# Patient Record
Sex: MALE | NOT HISPANIC OR LATINO | Employment: FULL TIME | URBAN - METROPOLITAN AREA
[De-identification: names, ages, dates, MRNs, and addresses within clinical notes are randomized per-mention and may not be internally consistent; named-entity substitution may affect disease eponyms.]

---

## 2019-01-16 ENCOUNTER — TRANSCRIBE ORDERS (OUTPATIENT)
Dept: ADMINISTRATIVE | Facility: HOSPITAL | Age: 59
End: 2019-01-16

## 2019-01-16 DIAGNOSIS — M77.11 RIGHT LATERAL EPICONDYLITIS: Primary | ICD-10-CM

## 2021-01-06 ENCOUNTER — TELEPHONE (OUTPATIENT)
Dept: GASTROENTEROLOGY | Facility: CLINIC | Age: 61
End: 2021-01-06

## 2021-01-06 DIAGNOSIS — K21.9 GASTROESOPHAGEAL REFLUX DISEASE WITHOUT ESOPHAGITIS: Primary | ICD-10-CM

## 2021-01-06 RX ORDER — PANTOPRAZOLE SODIUM 40 MG/1
40 TABLET, DELAYED RELEASE ORAL DAILY
Qty: 90 TABLET | Refills: 3 | Status: SHIPPED | OUTPATIENT
Start: 2021-01-06

## 2021-02-12 NOTE — TELEPHONE ENCOUNTER
PT LEFT MESSAGE TO SWITCH PANTOPRAZOLE TO OMEPRAZOLE, LEFT MESSAGE FOR PT TO RETURN CALL TO DISCUSS

## 2021-04-08 DIAGNOSIS — Z23 ENCOUNTER FOR IMMUNIZATION: ICD-10-CM

## 2022-03-09 ENCOUNTER — TELEPHONE (OUTPATIENT)
Dept: GASTROENTEROLOGY | Facility: CLINIC | Age: 62
End: 2022-03-09

## 2022-03-09 NOTE — TELEPHONE ENCOUNTER
Recall call went out via [employer] in 5/2021 with no return calls from pt to schedule  Pt is due for an appt with Dr Raisa Tenorio for hx of GERD  I lmom for pt to please call back to schedule an appt  Will call pt again in two weeks if do not hear back from him

## 2022-03-23 NOTE — TELEPHONE ENCOUNTER
I lmom for pt to please call back to schedule a yearly f/u appt with Dr Felix Becerril   Will wait for pt's call back at this time

## 2023-01-01 ENCOUNTER — APPOINTMENT (EMERGENCY)
Dept: RADIOLOGY | Facility: HOSPITAL | Age: 63
End: 2023-01-01

## 2023-01-01 ENCOUNTER — HOSPITAL ENCOUNTER (INPATIENT)
Facility: HOSPITAL | Age: 63
LOS: 1 days | Discharge: HOME | End: 2023-01-02
Attending: EMERGENCY MEDICINE | Admitting: INTERNAL MEDICINE

## 2023-01-01 DIAGNOSIS — I21.3 STEMI (ST ELEVATION MYOCARDIAL INFARCTION) (HCC): ICD-10-CM

## 2023-01-01 DIAGNOSIS — I21.29 ACUTE MI, LATERAL WALL (HCC): Primary | ICD-10-CM

## 2023-01-01 PROBLEM — D72.829 LEUKOCYTOSIS: Status: ACTIVE | Noted: 2023-01-01

## 2023-01-01 PROBLEM — E87.6 HYPOKALEMIA: Status: ACTIVE | Noted: 2023-01-01

## 2023-01-01 PROBLEM — E78.2 MIXED HYPERLIPIDEMIA: Status: ACTIVE | Noted: 2023-01-01

## 2023-01-01 PROBLEM — K21.9 GERD (GASTROESOPHAGEAL REFLUX DISEASE): Status: ACTIVE | Noted: 2023-01-01

## 2023-01-01 PROBLEM — R79.89 ELEVATED LFTS: Status: ACTIVE | Noted: 2023-01-01

## 2023-01-01 LAB
2HR DELTA HS TROPONIN: 224 NG/L
4HR DELTA HS TROPONIN: 836 NG/L
ALBUMIN SERPL BCP-MCNC: 4.2 G/DL (ref 3.5–5)
ALP SERPL-CCNC: 89 U/L (ref 34–104)
ALT SERPL W P-5'-P-CCNC: 64 U/L (ref 7–52)
ANION GAP SERPL CALCULATED.3IONS-SCNC: 11 MMOL/L (ref 4–13)
ANION GAP SERPL CALCULATED.3IONS-SCNC: 14 MMOL/L (ref 4–13)
APTT PPP: 24 SECONDS (ref 23–37)
AST SERPL W P-5'-P-CCNC: 32 U/L (ref 13–39)
ATRIAL RATE: 68 BPM
ATRIAL RATE: 81 BPM
BASOPHILS # BLD MANUAL: 0 THOUSAND/UL (ref 0–0.1)
BASOPHILS NFR MAR MANUAL: 0 % (ref 0–1)
BILIRUB SERPL-MCNC: 0.47 MG/DL (ref 0.2–1)
BUN SERPL-MCNC: 22 MG/DL (ref 5–25)
BUN SERPL-MCNC: 23 MG/DL (ref 5–25)
CALCIUM SERPL-MCNC: 10.2 MG/DL (ref 8.4–10.2)
CALCIUM SERPL-MCNC: 9.3 MG/DL (ref 8.4–10.2)
CARDIAC TROPONIN I PNL SERPL HS: 230 NG/L
CARDIAC TROPONIN I PNL SERPL HS: 6 NG/L
CARDIAC TROPONIN I PNL SERPL HS: 842 NG/L
CHLORIDE SERPL-SCNC: 99 MMOL/L (ref 96–108)
CHLORIDE SERPL-SCNC: 99 MMOL/L (ref 96–108)
CHOLEST SERPL-MCNC: 223 MG/DL
CO2 SERPL-SCNC: 23 MMOL/L (ref 21–32)
CO2 SERPL-SCNC: 24 MMOL/L (ref 21–32)
CREAT SERPL-MCNC: 0.9 MG/DL (ref 0.6–1.3)
CREAT SERPL-MCNC: 1 MG/DL (ref 0.6–1.3)
EOSINOPHIL # BLD MANUAL: 0.25 THOUSAND/UL (ref 0–0.4)
EOSINOPHIL NFR BLD MANUAL: 2 % (ref 0–6)
ERYTHROCYTE [DISTWIDTH] IN BLOOD BY AUTOMATED COUNT: 12.9 % (ref 11.6–15.1)
EST. AVERAGE GLUCOSE BLD GHB EST-MCNC: 108 MG/DL
EST. AVERAGE GLUCOSE BLD GHB EST-MCNC: 111 MG/DL
GFR SERPL CREATININE-BSD FRML MDRD: 79 ML/MIN/1.73SQ M
GFR SERPL CREATININE-BSD FRML MDRD: 89 ML/MIN/1.73SQ M
GLUCOSE SERPL-MCNC: 119 MG/DL (ref 65–140)
GLUCOSE SERPL-MCNC: 88 MG/DL (ref 65–140)
HBA1C MFR BLD: 5.4 %
HBA1C MFR BLD: 5.5 %
HCT VFR BLD AUTO: 48.8 % (ref 36.5–49.3)
HDLC SERPL-MCNC: 39 MG/DL
HGB BLD-MCNC: 17.2 G/DL (ref 12–17)
INR PPP: 0.89 (ref 0.84–1.19)
KCT BLD-ACNC: 226 SEC (ref 89–137)
KCT BLD-ACNC: 279 SEC (ref 89–137)
LYMPHOCYTES # BLD AUTO: 3.7 THOUSAND/UL (ref 0.6–4.47)
LYMPHOCYTES # BLD AUTO: 30 % (ref 14–44)
MAGNESIUM SERPL-MCNC: 2.2 MG/DL (ref 1.9–2.7)
MCH RBC QN AUTO: 30.4 PG (ref 26.8–34.3)
MCHC RBC AUTO-ENTMCNC: 35.2 G/DL (ref 31.4–37.4)
MCV RBC AUTO: 86 FL (ref 82–98)
MONOCYTES # BLD AUTO: 0.62 THOUSAND/UL (ref 0–1.22)
MONOCYTES NFR BLD: 5 % (ref 4–12)
NEUTROPHILS # BLD MANUAL: 7.77 THOUSAND/UL (ref 1.85–7.62)
NEUTS BAND NFR BLD MANUAL: 6 % (ref 0–8)
NEUTS SEG NFR BLD AUTO: 57 % (ref 43–75)
NONHDLC SERPL-MCNC: 184 MG/DL
P AXIS: 66 DEGREES
P AXIS: 70 DEGREES
PLATELET # BLD AUTO: 247 THOUSANDS/UL (ref 149–390)
PLATELET BLD QL SMEAR: ADEQUATE
PMV BLD AUTO: 8.5 FL (ref 8.9–12.7)
POTASSIUM SERPL-SCNC: 3.4 MMOL/L (ref 3.5–5.3)
POTASSIUM SERPL-SCNC: 4 MMOL/L (ref 3.5–5.3)
PR INTERVAL: 150 MS
PR INTERVAL: 152 MS
PROT SERPL-MCNC: 7.2 G/DL (ref 6.4–8.4)
PROTHROMBIN TIME: 12.2 SECONDS (ref 11.6–14.5)
QRS AXIS: -1 DEGREES
QRS AXIS: -18 DEGREES
QRSD INTERVAL: 82 MS
QRSD INTERVAL: 82 MS
QT INTERVAL: 384 MS
QT INTERVAL: 410 MS
QTC INTERVAL: 435 MS
QTC INTERVAL: 446 MS
RBC # BLD AUTO: 5.66 MILLION/UL (ref 3.88–5.62)
RBC MORPH BLD: NORMAL
SODIUM SERPL-SCNC: 134 MMOL/L (ref 135–147)
SODIUM SERPL-SCNC: 136 MMOL/L (ref 135–147)
SPECIMEN SOURCE: ABNORMAL
SPECIMEN SOURCE: ABNORMAL
T WAVE AXIS: -14 DEGREES
T WAVE AXIS: -27 DEGREES
TRIGL SERPL-MCNC: 899 MG/DL
TSH SERPL DL<=0.05 MIU/L-ACNC: 3.71 UIU/ML (ref 0.45–4.5)
VENTRICULAR RATE: 68 BPM
VENTRICULAR RATE: 81 BPM
WBC # BLD AUTO: 12.33 THOUSAND/UL (ref 4.31–10.16)

## 2023-01-01 PROCEDURE — B2111ZZ FLUOROSCOPY OF MULTIPLE CORONARY ARTERIES USING LOW OSMOLAR CONTRAST: ICD-10-PCS | Performed by: INTERNAL MEDICINE

## 2023-01-01 PROCEDURE — 027034Z DILATION OF CORONARY ARTERY, ONE ARTERY WITH DRUG-ELUTING INTRALUMINAL DEVICE, PERCUTANEOUS APPROACH: ICD-10-PCS | Performed by: INTERNAL MEDICINE

## 2023-01-01 DEVICE — XIENCE SKYPOINT™ EVEROLIMUS ELUTING CORONARY STENT SYSTEM 2.75 MM X 33 MM / RAPID-EXCHANGE
Type: IMPLANTABLE DEVICE | Site: CORONARY | Status: FUNCTIONAL
Brand: XIENCE SKYPOINT™

## 2023-01-01 RX ORDER — HEPARIN SODIUM 1000 [USP'U]/ML
INJECTION, SOLUTION INTRAVENOUS; SUBCUTANEOUS CODE/TRAUMA/SEDATION MEDICATION
Status: DISCONTINUED | OUTPATIENT
Start: 2023-01-01 | End: 2023-01-01 | Stop reason: HOSPADM

## 2023-01-01 RX ORDER — POTASSIUM CHLORIDE 20 MEQ/1
20 TABLET, EXTENDED RELEASE ORAL ONCE
Status: COMPLETED | OUTPATIENT
Start: 2023-01-01 | End: 2023-01-01

## 2023-01-01 RX ORDER — METOPROLOL TARTRATE 50 MG/1
50 TABLET, FILM COATED ORAL EVERY 12 HOURS SCHEDULED
Status: DISCONTINUED | OUTPATIENT
Start: 2023-01-01 | End: 2023-01-02 | Stop reason: HOSPADM

## 2023-01-01 RX ORDER — ISOSORBIDE MONONITRATE 30 MG/1
30 TABLET, EXTENDED RELEASE ORAL DAILY
Status: DISCONTINUED | OUTPATIENT
Start: 2023-01-01 | End: 2023-01-02 | Stop reason: HOSPADM

## 2023-01-01 RX ORDER — LOSARTAN POTASSIUM 50 MG/1
50 TABLET ORAL DAILY
Status: DISCONTINUED | OUTPATIENT
Start: 2023-01-02 | End: 2023-01-02 | Stop reason: HOSPADM

## 2023-01-01 RX ORDER — ATORVASTATIN CALCIUM 40 MG/1
80 TABLET, FILM COATED ORAL EVERY EVENING
Status: DISCONTINUED | OUTPATIENT
Start: 2023-01-01 | End: 2023-01-02 | Stop reason: HOSPADM

## 2023-01-01 RX ORDER — KETOROLAC TROMETHAMINE 30 MG/ML
15 INJECTION, SOLUTION INTRAMUSCULAR; INTRAVENOUS ONCE
Status: COMPLETED | OUTPATIENT
Start: 2023-01-01 | End: 2023-01-01

## 2023-01-01 RX ORDER — METOPROLOL TARTRATE 5 MG/5ML
INJECTION INTRAVENOUS CODE/TRAUMA/SEDATION MEDICATION
Status: DISCONTINUED | OUTPATIENT
Start: 2023-01-01 | End: 2023-01-01 | Stop reason: HOSPADM

## 2023-01-01 RX ORDER — SODIUM CHLORIDE 9 MG/ML
3 INJECTION INTRAVENOUS
Status: DISCONTINUED | OUTPATIENT
Start: 2023-01-01 | End: 2023-01-02 | Stop reason: HOSPADM

## 2023-01-01 RX ORDER — LIDOCAINE HYDROCHLORIDE 10 MG/ML
INJECTION, SOLUTION EPIDURAL; INFILTRATION; INTRACAUDAL; PERINEURAL CODE/TRAUMA/SEDATION MEDICATION
Status: DISCONTINUED | OUTPATIENT
Start: 2023-01-01 | End: 2023-01-01 | Stop reason: HOSPADM

## 2023-01-01 RX ORDER — FENTANYL CITRATE 50 UG/ML
INJECTION, SOLUTION INTRAMUSCULAR; INTRAVENOUS CODE/TRAUMA/SEDATION MEDICATION
Status: DISCONTINUED | OUTPATIENT
Start: 2023-01-01 | End: 2023-01-01 | Stop reason: HOSPADM

## 2023-01-01 RX ORDER — MAGNESIUM HYDROXIDE/ALUMINUM HYDROXICE/SIMETHICONE 120; 1200; 1200 MG/30ML; MG/30ML; MG/30ML
30 SUSPENSION ORAL EVERY 4 HOURS PRN
Status: DISCONTINUED | OUTPATIENT
Start: 2023-01-01 | End: 2023-01-02 | Stop reason: HOSPADM

## 2023-01-01 RX ORDER — ACETAMINOPHEN 325 MG/1
650 TABLET ORAL EVERY 4 HOURS PRN
Status: DISCONTINUED | OUTPATIENT
Start: 2023-01-01 | End: 2023-01-01

## 2023-01-01 RX ORDER — SODIUM CHLORIDE 9 MG/ML
100 INJECTION, SOLUTION INTRAVENOUS CONTINUOUS
Status: DISCONTINUED | OUTPATIENT
Start: 2023-01-01 | End: 2023-01-01

## 2023-01-01 RX ORDER — ENOXAPARIN SODIUM 100 MG/ML
40 INJECTION SUBCUTANEOUS
Status: DISCONTINUED | OUTPATIENT
Start: 2023-01-02 | End: 2023-01-02 | Stop reason: HOSPADM

## 2023-01-01 RX ORDER — NITROGLYCERIN 20 MG/100ML
5-200 INJECTION INTRAVENOUS
Status: DISCONTINUED | OUTPATIENT
Start: 2023-01-01 | End: 2023-01-01

## 2023-01-01 RX ORDER — MIDAZOLAM HYDROCHLORIDE 2 MG/2ML
INJECTION, SOLUTION INTRAMUSCULAR; INTRAVENOUS CODE/TRAUMA/SEDATION MEDICATION
Status: DISCONTINUED | OUTPATIENT
Start: 2023-01-01 | End: 2023-01-01 | Stop reason: HOSPADM

## 2023-01-01 RX ORDER — NITROGLYCERIN 0.4 MG/1
0.4 TABLET SUBLINGUAL
Status: DISCONTINUED | OUTPATIENT
Start: 2023-01-01 | End: 2023-01-02 | Stop reason: HOSPADM

## 2023-01-01 RX ORDER — ASPIRIN 81 MG/1
81 TABLET, CHEWABLE ORAL DAILY
Status: DISCONTINUED | OUTPATIENT
Start: 2023-01-01 | End: 2023-01-02 | Stop reason: HOSPADM

## 2023-01-01 RX ORDER — FAMOTIDINE 20 MG/1
20 TABLET, FILM COATED ORAL 2 TIMES DAILY PRN
Status: DISCONTINUED | OUTPATIENT
Start: 2023-01-01 | End: 2023-01-02 | Stop reason: HOSPADM

## 2023-01-01 RX ORDER — LOSARTAN POTASSIUM 25 MG/1
25 TABLET ORAL DAILY
Status: DISCONTINUED | OUTPATIENT
Start: 2023-01-01 | End: 2023-01-01

## 2023-01-01 RX ORDER — ASPIRIN 81 MG/1
324 TABLET, CHEWABLE ORAL ONCE
Status: COMPLETED | OUTPATIENT
Start: 2023-01-01 | End: 2023-01-01

## 2023-01-01 RX ORDER — VERAPAMIL HYDROCHLORIDE 2.5 MG/ML
INJECTION, SOLUTION INTRAVENOUS CODE/TRAUMA/SEDATION MEDICATION
Status: DISCONTINUED | OUTPATIENT
Start: 2023-01-01 | End: 2023-01-01 | Stop reason: HOSPADM

## 2023-01-01 RX ORDER — NITROGLYCERIN 20 MG/100ML
INJECTION INTRAVENOUS CODE/TRAUMA/SEDATION MEDICATION
Status: DISCONTINUED | OUTPATIENT
Start: 2023-01-01 | End: 2023-01-01 | Stop reason: HOSPADM

## 2023-01-01 RX ORDER — HEPARIN SODIUM 1000 [USP'U]/ML
4000 INJECTION, SOLUTION INTRAVENOUS; SUBCUTANEOUS ONCE
Status: COMPLETED | OUTPATIENT
Start: 2023-01-01 | End: 2023-01-01

## 2023-01-01 RX ORDER — NITROGLYCERIN 0.4 MG/1
0.4 TABLET SUBLINGUAL ONCE
Status: COMPLETED | OUTPATIENT
Start: 2023-01-01 | End: 2023-01-01

## 2023-01-01 RX ORDER — PANTOPRAZOLE SODIUM 40 MG/1
40 TABLET, DELAYED RELEASE ORAL
Status: DISCONTINUED | OUTPATIENT
Start: 2023-01-01 | End: 2023-01-02 | Stop reason: HOSPADM

## 2023-01-01 RX ORDER — IBUPROFEN 600 MG/1
600 TABLET ORAL EVERY 6 HOURS PRN
Status: DISCONTINUED | OUTPATIENT
Start: 2023-01-01 | End: 2023-01-01

## 2023-01-01 RX ORDER — ONDANSETRON 2 MG/ML
4 INJECTION INTRAMUSCULAR; INTRAVENOUS ONCE
Status: COMPLETED | OUTPATIENT
Start: 2023-01-01 | End: 2023-01-01

## 2023-01-01 RX ADMIN — TICAGRELOR 90 MG: 90 TABLET ORAL at 07:53

## 2023-01-01 RX ADMIN — ISOSORBIDE MONONITRATE 30 MG: 30 TABLET, EXTENDED RELEASE ORAL at 15:10

## 2023-01-01 RX ADMIN — METOPROLOL TARTRATE 25 MG: 25 TABLET, FILM COATED ORAL at 07:53

## 2023-01-01 RX ADMIN — TICAGRELOR 180 MG: 90 TABLET ORAL at 02:53

## 2023-01-01 RX ADMIN — ATORVASTATIN CALCIUM 80 MG: 40 TABLET, FILM COATED ORAL at 17:02

## 2023-01-01 RX ADMIN — FAMOTIDINE 20 MG: 20 TABLET ORAL at 17:02

## 2023-01-01 RX ADMIN — HEPARIN SODIUM 4000 UNITS: 1000 INJECTION INTRAVENOUS; SUBCUTANEOUS at 02:54

## 2023-01-01 RX ADMIN — SODIUM CHLORIDE 100 ML/HR: 0.9 INJECTION, SOLUTION INTRAVENOUS at 05:15

## 2023-01-01 RX ADMIN — NITROGLYCERIN 0.4 MG: 0.4 TABLET SUBLINGUAL at 17:03

## 2023-01-01 RX ADMIN — LOSARTAN POTASSIUM 25 MG: 25 TABLET, FILM COATED ORAL at 10:44

## 2023-01-01 RX ADMIN — ONDANSETRON 4 MG: 2 INJECTION INTRAMUSCULAR; INTRAVENOUS at 02:19

## 2023-01-01 RX ADMIN — POTASSIUM CHLORIDE 20 MEQ: 1500 TABLET, EXTENDED RELEASE ORAL at 05:15

## 2023-01-01 RX ADMIN — PANTOPRAZOLE SODIUM 40 MG: 40 TABLET, DELAYED RELEASE ORAL at 05:15

## 2023-01-01 RX ADMIN — METOPROLOL TARTRATE 50 MG: 50 TABLET, FILM COATED ORAL at 21:21

## 2023-01-01 RX ADMIN — ASPIRIN 81 MG CHEWABLE TABLET 324 MG: 81 TABLET CHEWABLE at 02:19

## 2023-01-01 RX ADMIN — NITROGLYCERIN 0.4 MG: 0.4 TABLET SUBLINGUAL at 02:48

## 2023-01-01 RX ADMIN — KETOROLAC TROMETHAMINE 15 MG: 30 INJECTION, SOLUTION INTRAMUSCULAR at 08:17

## 2023-01-01 RX ADMIN — NITROGLYCERIN 0.4 MG: 0.4 TABLET SUBLINGUAL at 14:55

## 2023-01-01 RX ADMIN — TICAGRELOR 90 MG: 90 TABLET ORAL at 21:21

## 2023-01-01 RX ADMIN — ALUMINUM HYDROXIDE, MAGNESIUM HYDROXIDE, AND DIMETHICONE 30 ML: 200; 20; 200 SUSPENSION ORAL at 19:32

## 2023-01-01 RX ADMIN — ATORVASTATIN CALCIUM 80 MG: 40 TABLET, FILM COATED ORAL at 05:15

## 2023-01-01 RX ADMIN — ALUMINUM HYDROXIDE, MAGNESIUM HYDROXIDE, AND DIMETHICONE 30 ML: 200; 20; 200 SUSPENSION ORAL at 06:01

## 2023-01-01 RX ADMIN — MORPHINE SULFATE 2 MG: 2 INJECTION, SOLUTION INTRAMUSCULAR; INTRAVENOUS at 07:31

## 2023-01-01 RX ADMIN — ASPIRIN 81 MG CHEWABLE TABLET 81 MG: 81 TABLET CHEWABLE at 07:52

## 2023-01-01 NOTE — NURSING NOTE
Spoke to dr Tien Moncada re: pts c/o CP 6-7/10 just two hours after having similar with relief after one NTG tablet  Pt was given another NTG tablet for this episode of pain also, relieved down to a 3/10  sbp 152  Plan to continue NTG tablets overnight

## 2023-01-01 NOTE — ASSESSMENT & PLAN NOTE
· Presented with crushing chest pain  EKG showed ST elevation in lateral leads > MI alert was called and patient underwent urgent cardiac cath by R radial artery which showed long proximal to mid LAD lesion 80 -90% s/p successful PCI with TOM placement  · Continue aspirin 81 mg daily and Brilinta 90 mg twice daily  · Continue Lopressor 25 mg twice daily    · Continue atorvastatin 80 mg daily  · Obtain echocardiogram  · Appreciate cardiology recommendations

## 2023-01-01 NOTE — CONSULTS
Consultation - Cardiology Team One  Cristin Head 57 y o  male MRN: [id number]  Unit/Bed#: S -01 Encounter: [id number]    Inpatient consult to Cardiology  Consult performed by: Rene Bosworth, CRNP  Consult ordered by: Palomo Stout MD          Physician Requesting Consult: Yanelis Valdez,*  Reason for Consult / Principal Problem: STEMI       Assessment/ Plan    1  Lateral STEMI  HS troponin 230/842  S/p urgent cardiac cath showing long proximal to mid LAD lesion 80-90% stenosis with successful PCI/TOM  On DAPT: aspirin and Brilinta, atrovastatin and metoprolol   Echocardiogram pending   Check hemoglobin A1C   CM consulted on cost of Brilinta     2  Hyperlipidemia/ Hypertriglyceridemia   Non   Unable to calculate LDL due to triglycerides 899    3  Hypertension   BP: 143/93  Patient reports he takes losartan at home, unclear dosing  On metoprolol and will start losartan       History of Present Illness   HPI: Cristin Head is a 57y o  year old male who has a history of hypertension, hyperlipidemia, hypertriglyceridemia and GERD  He do not follow with a cardiologist  He reports no history of known CAD, heart failure or dysrhythmias  He presents to [facility] ER 1/1/2023 with complaint of chest pain  Patient reports he went to bed yesterday evening and started feeling chest pain described as burning  He thought it was reflux and took a handful of TUMS  He reports no relief and usually he has good relief with TUMS  He then took [employer] and again had no relief  He notes chest pain started to radiate to his jaw which urged him to seek medical care  His wife drove him to the hospital  On arrival to ER, ECG showed ST elevation in lateral leads with ST depressions in other leads  He was sent to cardiac cath lab urgently  Cardiac cath showed long proximal to mid LAD lesion 80-90% stenosis with successful PCI/TOM   Post cardiac cath around 0700 this morning he developed chest pain again similar to last night  He received morphine with relief and slept for alittle bit after this  Currently he is chest pain free  No complaint of SOB or palpitations  Patient reports for the past week he has noticed occasional chest pain with exertion but did not think much into it  He lives at home with his wife  He lives an active lifestyle  He denies tobacco use  He had history of occasional cigar use but not in the past 5 years  He drinks 4-5 glasses of wine per week  No family history of heart disease  EKG reviewed personally:   Normal sinus rhythm with ST elevation in lateral leads and diffuse ST depressions in other leads   Ventricular rate  81 bpm  AL interval 150 ms  QRSD 82 ms  QT interval 384 ms  QTc interval 446 ms     Telemetry reviewed personally:   Normal sinus rhythm with bigeminy post cath  Review of Systems   Constitutional: Positive for malaise/fatigue  Negative for chills and fever  HENT: Negative for congestion  Cardiovascular: Negative for chest pain, dyspnea on exertion, leg swelling, orthopnea and palpitations  Respiratory: Negative for cough and shortness of breath  Musculoskeletal: Negative for falls  Gastrointestinal: Negative for bloating, nausea and vomiting  Neurological: Negative for dizziness and light-headedness  Psychiatric/Behavioral: Negative for altered mental status  All other systems reviewed and are negative  Historical Information   History reviewed  No pertinent past medical history  History reviewed  No pertinent surgical history  Social History     Substance and Sexual Activity   Alcohol Use Yes    Comment: occ     Social History     Substance and Sexual Activity   Drug Use Yes     Social History     Tobacco Use   Smoking Status Never   Smokeless Tobacco Never     Family History: History reviewed  No pertinent family history      Meds/Allergies   all current active meds have been reviewed and current meds:   Current Facility-Administered Medications   Medication Dose Route Frequency   • aluminum-magnesium hydroxide-simethicone (MYLANTA) oral suspension 30 mL  30 mL Oral Q4H PRN   • aspirin chewable tablet 81 mg  81 mg Oral Daily   • atorvastatin (LIPITOR) tablet 80 mg  80 mg Oral QPM   • [START ON 1/2/2023] enoxaparin (LOVENOX) subcutaneous injection 40 mg  40 mg Subcutaneous Q24H ABE   • ibuprofen (MOTRIN) tablet 600 mg  600 mg Oral Q6H PRN   • metoprolol tartrate (LOPRESSOR) tablet 25 mg  25 mg Oral Q12H ABE   • nitroglycerin (NITROSTAT) SL tablet 0 4 mg  0 4 mg Sublingual Q5 Min PRN   • pantoprazole (PROTONIX) EC tablet 40 mg  40 mg Oral Early Morning   • sodium chloride (PF) 0 9 % injection 3 mL  3 mL Intravenous Q1H PRN   • ticagrelor (BRILINTA) tablet 90 mg  90 mg Oral BID          Allergies   Allergen Reactions   • Pollen Extract Allergic Rhinitis       Objective   Vitals: Blood pressure 143/93, pulse 76, temperature 97 7 °F (36 5 °C), temperature source Oral, resp  rate 18, weight 96 9 kg (213 lb 10 oz), SpO2 99 %  ,     There is no height or weight on file to calculate BMI ,     Systolic (75ALG), UIQ:509 , Min:112 , MVS:621     Diastolic (21KOU), MRX:04, Min:63, Max:93            Intake/Output Summary (Last 24 hours) at 1/1/2023 0818  Last data filed at 1/1/2023 0801  Gross per 24 hour   Intake 215 ml   Output 300 ml   Net -85 ml     Weight (last 2 days)     Date/Time Weight    01/01/23 0215 96 9 (213 63)        Invasive Devices     Peripheral Intravenous Line  Duration           Peripheral IV 01/01/23 Left;Ventral (anterior) Forearm <1 day    Peripheral IV 01/01/23 Right Antecubital <1 day                  Physical Exam  Constitutional:       General: He is not in acute distress  Appearance: He is obese  HENT:      Head: Normocephalic  Mouth/Throat:      Mouth: Mucous membranes are moist    Cardiovascular:      Rate and Rhythm: Normal rate and regular rhythm  Pulses: Normal pulses     Pulmonary:      Effort: Pulmonary effort is normal  No respiratory distress  Breath sounds: Normal breath sounds  Abdominal:      General: Bowel sounds are normal       Palpations: Abdomen is soft  Musculoskeletal:         General: No swelling  Normal range of motion  Cervical back: Neck supple  Skin:     General: Skin is warm and dry  Capillary Refill: Capillary refill takes less than 2 seconds  Comments: R wrist TR band intact  No bleeding or hematoma    Neurological:      General: No focal deficit present  Mental Status: He is alert  Psychiatric:         Mood and Affect: Mood normal        LABORATORY RESULTS:      CBC with diff:   Results from last 7 days   Lab Units 01/01/23  0217   WBC Thousand/uL 12 33*   HEMOGLOBIN g/dL 17 2*   HEMATOCRIT % 48 8   MCV fL 86   PLATELETS Thousands/uL 247   MCH pg 30 4   MCHC g/dL 35 2   RDW % 12 9   MPV fL 8 5*       CMP:  Results from last 7 days   Lab Units 01/01/23  0641 01/01/23  0217   POTASSIUM mmol/L 4 0 3 4*   CHLORIDE mmol/L 99 99   CO2 mmol/L 24 23   BUN mg/dL 22 23   CREATININE mg/dL 0 90 1 00   CALCIUM mg/dL 9 3 10 2   AST U/L  --  32   ALT U/L  --  64*   ALK PHOS U/L  --  89   EGFR ml/min/1 73sq m 89 79       BMP:  Results from last 7 days   Lab Units 01/01/23  0641 01/01/23  0217   POTASSIUM mmol/L 4 0 3 4*   CHLORIDE mmol/L 99 99   CO2 mmol/L 24 23   BUN mg/dL 22 23   CREATININE mg/dL 0 90 1 00   CALCIUM mg/dL 9 3 10 2          No results found for: NTBNP         Results from last 7 days   Lab Units 01/01/23  0217   MAGNESIUM mg/dL 2 2                     Results from last 7 days   Lab Units 01/01/23  0217   INR  0 89     Lipid Profile:   No results found for: CHOL  Lab Results   Component Value Date    HDL 39 (L) 01/01/2023     Lab Results   Component Value Date    [address]  01/01/2023      Comment:      Calculated LDL invalid, triglycerides >400 mg/dl  This screening LDL is a calculated result     It does not have the accuracy of the Direct Measured LDL in the monitoring of patients with hyperlipidemia and/or statin therapy  Direct Measure LDL (CMU992) must be ordered separately in these patients  Lab Results   Component Value Date    TRIG 695 (H) 01/01/2023         Cardiac testing:   No results found for this or any previous visit  No results found for this or any previous visit  No valid procedures specified  No results found for this or any previous visit  Imaging:   XR chest 1 view portable    Result Date: 1/1/2023  Narrative: CHEST INDICATION:   chest pain  COMPARISON:  None EXAM PERFORMED/VIEWS:  XR CHEST PORTABLE Single view FINDINGS: Cardiomediastinal silhouette appears unremarkable  The lungs are clear  No pneumothorax or pleural effusion  Osseous structures appear within normal limits for patient age  Impression: No acute cardiopulmonary disease  Workstation performed: [id number]     Thank you for allowing us to participate in this patient's care  This pt will follow up with          once discharged  Counseling / Coordination of Care  Total floor / unit time spent today 45 minutes  Greater than 50% of total time was spent with the patient and / or family counseling and / or coordination of care  A description of the counseling / coordination of care: Review of history, current assessment, development of a plan  Code Status: No Order    ** Please Note: Dragon 360 Dictation voice to text software may have been used in the creation of this document   **

## 2023-01-01 NOTE — PROGRESS NOTES
[facility]  Progress Note Cokeli Sherry 1960, 57 y o  male MRN: [id number]  Unit/Bed#: S -01 Encounter: [id number]  Primary Care Provider: No primary care provider on file  Date and time admitted to hospital: 1/1/2023  2:05 AM    * STEMI (ST elevation myocardial infarction) [facility])  Assessment & Plan  · Presented with crushing chest pain  EKG showed ST elevation in lateral leads > MI alert was called and patient underwent urgent cardiac cath by R radial artery which showed long proximal to mid LAD lesion 80 -90% s/p successful PCI with TOM placement  · Continue aspirin 81 mg daily and Brilinta 90 mg twice daily  · Continue Lopressor 25 mg twice daily  · Continue atorvastatin 80 mg daily  · Obtain echocardiogram  · Appreciate cardiology recommendations    Elevated LFTs  Assessment & Plan  · Secondary to STEMI  · Monitor LFTs    Leukocytosis  Assessment & Plan  · Likely reactive  · No signs of infection  Monitor off antibiotics    Hypokalemia  Assessment & Plan  · Replaced  · Continue to monitor    Mixed hyperlipidemia  Assessment & Plan  · Start atorvastatin 80 mg daily    GERD (gastroesophageal reflux disease)  Assessment & Plan  · Continue pantoprazole 40 mg daily         Reason for Admission / Chief Complaint: Chest pain     History of Present Illness:  Madelyn Oglesby is a 57 y o  male with past medical history of GERD who presents with crushing chest pain for 1 hour radiated to the jaw with associated diaphoresis and nausea  EKG showed ST elevation in lateral leads for which MI alert was called patient underwent urgent cardiac cath by R radial artery which showed long proximal to mid LAD lesion 80 -90% s/p successful PCI with TOM placement  Patient reports significant improvement of his chest pain  History obtained from the patient  Past Medical History:  History reviewed  No pertinent past medical history  Past Surgical History:  History reviewed   No pertinent surgical history  Past Family History:  History reviewed  No pertinent family history  Social History:  E-Cigarette/Vaping   • E-Cigarette Use Never User      E-Cigarette/Vaping Substances     Social History     Tobacco Use   Smoking Status Never   Smokeless Tobacco Never     Social History     Substance and Sexual Activity   Alcohol Use Yes    Comment: occ     Social History     Substance and Sexual Activity   Drug Use Yes     Marital Status: /Civil Union  Exercise History: NA     Medications:  Current Facility-Administered Medications   Medication Dose Route Frequency   • acetaminophen (TYLENOL) tablet 650 mg  650 mg Oral Q4H PRN   • aluminum-magnesium hydroxide-simethicone (MYLANTA) oral suspension 30 mL  30 mL Oral Q4H PRN   • aspirin chewable tablet 81 mg  81 mg Oral Daily   • atorvastatin (LIPITOR) tablet 80 mg  80 mg Oral QPM   • metoprolol tartrate (LOPRESSOR) tablet 25 mg  25 mg Oral Q12H [address]   • pantoprazole (PROTONIX) EC tablet 40 mg  40 mg Oral Early Morning   • sodium chloride (PF) 0 9 % injection 3 mL  3 mL Intravenous Q1H PRN   • sodium chloride 0 9 % infusion  100 mL/hr Intravenous Continuous   • [START ON 1/2/2023] ticagrelor (BRILINTA) tablet 90 mg  90 mg Oral Q12H [address]   • ticagrelor (BRILINTA) tablet 90 mg  90 mg Oral BID     Home medications:  Prior to Admission medications    Medication Sig Start Date End Date Taking? Authorizing Provider   pantoprazole (PROTONIX) 40 mg tablet Take 1 tablet (40 mg total) by mouth daily 1/6/21   Deric Batres MD     Allergies: Allergies   Allergen Reactions   • Pollen Extract Allergic Rhinitis        ROS:   Review of Systems   Constitutional: Negative for chills and fever  HENT: Negative for ear pain and sore throat  Eyes: Negative for pain and visual disturbance  Respiratory: Negative for cough and shortness of breath  Cardiovascular: Positive for chest pain  Negative for palpitations     Gastrointestinal: Negative for abdominal pain and vomiting  Genitourinary: Negative for dysuria and hematuria  Musculoskeletal: Negative for arthralgias and back pain  Skin: Negative for color change and rash  Neurological: Negative for seizures and syncope  All other systems reviewed and are negative  Vitals:  Vitals:    23 0215 23 0230 23 0258 23 0411   BP:  135/82 112/63 121/75   BP Location:  Right arm Left arm Left arm   Pulse:  78 71 70   Resp:  18 16    Temp:    99 °F (37 2 °C)   TempSrc:    Oral   SpO2:  100% 98%    Weight: 96 9 kg (213 lb 10 oz)        Temperature:   Temp (24hrs), Av 8 °F (37 1 °C), Min:98 5 °F (36 9 °C), Max:99 °F (37 2 °C)    Current: Temperature: 99 °F (37 2 °C)     Weights: There is no height or weight on file to calculate BMI  Hemodynamic Monitoring:  N/A     Non-Invasive/Invasive Ventilation Settings:  Respiratory    Lab Data (Last 4 hours)    None         O2/Vent Data (Last 4 hours)    None              No results found for: PHART, EZZ6NUA, PO2ART, TDC6IRN, X1FCFEBW, BEART, SOURCE  SpO2: SpO2: 98 %     Physical Exam:  Physical Exam  Vitals and nursing note reviewed  Constitutional:       General: He is not in acute distress  Appearance: He is well-developed  HENT:      Head: Normocephalic and atraumatic  Eyes:      Conjunctiva/sclera: Conjunctivae normal    Cardiovascular:      Rate and Rhythm: Normal rate and regular rhythm  Heart sounds: No murmur heard  Pulmonary:      Effort: Pulmonary effort is normal  No respiratory distress  Breath sounds: Normal breath sounds  Abdominal:      Palpations: Abdomen is soft  Tenderness: There is no abdominal tenderness  Musculoskeletal:         General: No swelling  Cervical back: Neck supple  Skin:     General: Skin is warm and dry  Capillary Refill: Capillary refill takes less than 2 seconds  Neurological:      General: No focal deficit present        Mental Status: He is alert and oriented to person, place, and time  Psychiatric:         Mood and Affect: Mood normal          Behavior: Behavior normal           Labs:  Results from last 7 days   Lab Units 01/01/23  0217   WBC Thousand/uL 12 33*   HEMOGLOBIN g/dL 17 2*   HEMATOCRIT % 48 8   PLATELETS Thousands/uL 247   MONO PCT % 5      Results from last 7 days   Lab Units 01/01/23  0217   SODIUM mmol/L 136   POTASSIUM mmol/L 3 4*   CHLORIDE mmol/L 99   CO2 mmol/L 23   BUN mg/dL 23   CREATININE mg/dL 1 00   CALCIUM mg/dL 10 2   ALK PHOS U/L 89   ALT U/L 64*   AST U/L 32     Results from last 7 days   Lab Units 01/01/23  0217   MAGNESIUM mg/dL 2 2          Results from last 7 days   Lab Units 01/01/23  0217   INR  0 89   PTT seconds 24         No results found for: TROPONINI     Imaging:  I have personally reviewed pertinent reports  EKG: STEMI of lateral leads   micro:  No results found for: Marivel Scale, WOUNDCULT, SPUTUMCULTUR     VTE Pharmacologic Prophylaxis: Enoxaparin (Lovenox)  VTE Mechanical Prophylaxis: sequential compression device     Invasive lines and devices: Invasive Devices     Peripheral Intravenous Line  Duration           Peripheral IV 01/01/23 Left;Ventral (anterior) Forearm <1 day    Peripheral IV 01/01/23 Right Antecubital <1 day                 Code Status: No Order  POA:    POLST:       Given critical illness, patient length of stay will require greater than two midnights  Counseling / Coordination of Care  Total Critical Care time spent 60 minutes excluding procedures, teaching and family updates  Portions of the record may have been created with voice recognition software  Occasional wrong word or "sound a like" substitutions may have occurred due to the inherent limitations of voice recognition software  Read the chart carefully and recognize, using context, where substitutions have occurred          Malika Pérez MD

## 2023-01-01 NOTE — ED PROVIDER NOTES
History  Chief Complaint   Patient presents with   • Chest Pain     Patient reports waking from chest half an hour ago with no complaints of prior cardiac Hx  Jayy Bell is a 57 y o  male who presents with the chief complaint of a crushing chest pain with associated nausea, clammy skin and radiation to his left arm  Onset was 30 minutes ago  This pain woke him from sleep  No similar pain in the past   He has a pmh significant for hypertension  He did take 2 sildenafil earlier in the evening  No fevers, chills, no significant sob, no vomiting  History provided by:  Patient and significant other   used: No    Chest Pain  Pain location:  Substernal area and L chest  Pain quality: pressure    Pain radiates to:  L arm  Pain radiates to the back: yes    Pain severity:  Severe  Onset quality:  Sudden  Duration:  30 minutes  Timing:  Constant  Progression:  Unchanged  Chronicity:  New  Context: at rest    Relieved by:  Nothing  Worsened by:  Nothing tried  Ineffective treatments:  None tried  Associated symptoms: anxiety, diaphoresis and nausea    Associated symptoms: no fever, no palpitations, no shortness of breath and not vomiting    Risk factors: hypertension and male sex    Risk factors: no diabetes mellitus and no high cholesterol        Prior to Admission Medications   Prescriptions Last Dose Informant Patient Reported? Taking? pantoprazole (PROTONIX) 40 mg tablet   No No   Sig: Take 1 tablet (40 mg total) by mouth daily      Facility-Administered Medications: None       History reviewed  No pertinent past medical history  History reviewed  No pertinent surgical history  History reviewed  No pertinent family history  I have reviewed and agree with the history as documented      E-Cigarette/Vaping   • E-Cigarette Use Never User      E-Cigarette/Vaping Substances     Social History     Tobacco Use   • Smoking status: Never   • Smokeless tobacco: Never   Vaping Use   • Vaping Use: Never used   Substance Use Topics   • Alcohol use: Yes     Comment: occ   • Drug use: Yes       Review of Systems   Constitutional: Positive for diaphoresis  Negative for chills and fever  Respiratory: Negative for shortness of breath  Cardiovascular: Positive for chest pain  Negative for palpitations  Gastrointestinal: Positive for nausea  Negative for diarrhea and vomiting  Genitourinary: Negative for dysuria and frequency  Skin: Negative for rash  All other systems reviewed and are negative  Physical Exam  Physical Exam  Vitals and nursing note reviewed  Constitutional:       General: He is in acute distress  Appearance: He is well-developed  He is diaphoretic (clammy)  HENT:      Head: Normocephalic and atraumatic  Eyes:      Pupils: Pupils are equal, round, and reactive to light  Neck:      Vascular: No JVD  Cardiovascular:      Rate and Rhythm: Normal rate and regular rhythm  Heart sounds: Normal heart sounds  No murmur heard  No friction rub  No gallop  Pulmonary:      Effort: Pulmonary effort is normal  No respiratory distress  Breath sounds: Normal breath sounds  No wheezing or rales  Chest:      Chest wall: No tenderness  Musculoskeletal:         General: No tenderness  Normal range of motion  Cervical back: Normal range of motion  Skin:     General: Skin is warm  Neurological:      General: No focal deficit present  Mental Status: He is alert and oriented to person, place, and time  Psychiatric:         Behavior: Behavior normal          Thought Content:  Thought content normal          Judgment: Judgment normal          Vital Signs  ED Triage Vitals   Temperature Pulse Respirations Blood Pressure SpO2   01/01/23 0211 01/01/23 0211 01/01/23 0211 01/01/23 0211 01/01/23 0211   98 5 °F (36 9 °C) 75 18 141/70 100 %      Temp Source Heart Rate Source Patient Position - Orthostatic VS BP Location FiO2 (%)   01/01/23 0210 01/01/23 0211 01/01/23 0211 01/01/23 0211 --   Oral Monitor Lying Right arm       Pain Score       01/01/23 0258       3           Vitals:    01/01/23 0211 01/01/23 0230 01/01/23 0258 01/01/23 0411   BP: 141/70 135/82 112/63 121/75   Pulse: 75 78 71 70   Patient Position - Orthostatic VS: Lying Lying Lying Lying         Visual Acuity      ED Medications  Medications   sodium chloride (PF) 0 9 % injection 3 mL (has no administration in time range)   ticagrelor (BRILINTA) tablet 180 mg (180 mg Oral Given 1/1/23 0253)     And   ticagrelor (BRILINTA) tablet 90 mg (has no administration in time range)   sodium chloride 0 9 % infusion (100 mL/hr Intravenous New Bag 1/1/23 0515)   acetaminophen (TYLENOL) tablet 650 mg (has no administration in time range)   aspirin chewable tablet 81 mg (has no administration in time range)   ticagrelor (BRILINTA) tablet 90 mg (has no administration in time range)   metoprolol tartrate (LOPRESSOR) tablet 25 mg (has no administration in time range)   atorvastatin (LIPITOR) tablet 80 mg (80 mg Oral Given 1/1/23 0515)   pantoprazole (PROTONIX) EC tablet 40 mg (40 mg Oral Given 1/1/23 0515)   aspirin chewable tablet 324 mg (324 mg Oral Given 1/1/23 0219)   ondansetron (ZOFRAN) injection 4 mg (4 mg Intravenous Given 1/1/23 0219)   nitroglycerin (NITROSTAT) SL tablet 0 4 mg (0 4 mg Sublingual Given 1/1/23 0248)   heparin (porcine) injection 4,000 Units (4,000 Units Intravenous Given 1/1/23 0254)   potassium chloride (K-DUR,KLOR-CON) CR tablet 20 mEq (20 mEq Oral Given 1/1/23 0515)       Diagnostic Studies  Results Reviewed     Procedure Component Value Units Date/Time    HS Troponin I 2hr [[id number]] Collected: 01/01/23 0454    Lab Status:  In process Specimen: Blood from Arm, Right Updated: 01/01/23 0512    HS Troponin I 4hr [[id number]]     Lab Status: No result Specimen: Blood     Lipid panel [[id number]]  (Abnormal) Collected: 01/01/23 0217    Lab Status: Final result Specimen: Blood from Arm, Left Updated: 01/01/23 0315     Cholesterol 223 mg/dL      Triglycerides 899 mg/dL      HDL, Direct 39 mg/dL      LDL Calculated --     Non-HDL-Chol (CHOL-HDL) 184 mg/dl     Magnesium [[id number]]  (Normal) Collected: 01/01/23 0217    Lab Status: Final result Specimen: Blood from Arm, Left Updated: 01/01/23 0315     Magnesium 2 2 mg/dL     CBC and differential [[id number]]  (Abnormal) Collected: 01/01/23 0217    Lab Status: Final result Specimen: Blood from Arm, Left Updated: 01/01/23 0249     WBC 12 33 Thousand/uL      RBC 5 66 Million/uL      Hemoglobin 17 2 g/dL      Hematocrit 48 8 %      MCV 86 fL      MCH 30 4 pg      MCHC 35 2 g/dL      RDW 12 9 %      MPV 8 5 fL      Platelets 235 Thousands/uL     Narrative: This is an appended report  These results have been appended to a previously verified report      HS Troponin 0hr (reflex protocol) [[id number]]  (Normal) Collected: 01/01/23 0217    Lab Status: Final result Specimen: Blood from Arm, Left Updated: 01/01/23 0249     hs TnI 0hr 6 ng/L     Manual Differential(PHLEBS Do Not Order) [[id number]]  (Abnormal) Collected: 01/01/23 0217    Lab Status: Final result Specimen: Blood from Arm, Left Updated: 01/01/23 0249     Segmented % 57 %      Bands % 6 %      Lymphocytes % 30 %      Monocytes % 5 %      Eosinophils, % 2 %      Basophils % 0 %      Absolute Neutrophils 7 77 Thousand/uL      Lymphocytes Absolute 3 70 Thousand/uL      Monocytes Absolute 0 62 Thousand/uL      Eosinophils Absolute 0 25 Thousand/uL      Basophils Absolute 0 00 Thousand/uL      Total Counted --     RBC Morphology Normal     Platelet Estimate Adequate    Comprehensive metabolic panel [[id number]]  (Abnormal) Collected: 01/01/23 0217    Lab Status: Final result Specimen: Blood from Arm, Left Updated: 01/01/23 0242     Sodium 136 mmol/L      Potassium 3 4 mmol/L      Chloride 99 mmol/L      CO2 23 mmol/L      ANION GAP 14 mmol/L      BUN 23 mg/dL      Creatinine 1 00 mg/dL      Glucose 88 mg/dL Calcium 10 2 mg/dL      AST 32 U/L      ALT 64 U/L      Alkaline Phosphatase 89 U/L      Total Protein 7 2 g/dL      Albumin 4 2 g/dL      Total Bilirubin 0 47 mg/dL      eGFR 79 ml/min/1 73sq m     Narrative:      [place] guidelines for Chronic Kidney Disease (CKD):   •  Stage 1 with normal or high GFR (GFR > 90 mL/min/1 73 square meters)  •  Stage 2 Mild CKD (GFR = 60-89 mL/min/1 73 square meters)  •  Stage 3A Moderate CKD (GFR = 45-59 mL/min/1 73 square meters)  •  Stage 3B Moderate CKD (GFR = 30-44 mL/min/1 73 square meters)  •  Stage 4 Severe CKD (GFR = 15-29 mL/min/1 73 square meters)  •  Stage 5 End Stage CKD (GFR <15 mL/min/1 73 square meters)  Note: GFR calculation is accurate only with a steady state creatinine    Protime-INR [[id number]]  (Normal) Collected: 01/01/23 0217    Lab Status: Final result Specimen: Blood from Arm, Left Updated: 01/01/23 0238     Protime 12 2 seconds      INR 0 89    APTT [[id number]]  (Normal) Collected: 01/01/23 0217    Lab Status: Final result Specimen: Blood from Arm, Left Updated: 01/01/23 0238     PTT 24 seconds                  XR chest 1 view portable   Final Result by Mariia Cohn MD (01/01 7658)      No acute cardiopulmonary disease                    Workstation performed: [id number]                    Procedures  ECG 12 Lead Documentation Only    Date/Time: 1/1/2023 2:11 AM  Performed by: Kiki Woodard MD  Authorized by: Kiki Woodard MD     Indications / Diagnosis:  Chest pain  ECG reviewed by me, the ED Provider: yes    Patient location:  ED  Previous ECG:     Previous ECG:  Unavailable  Interpretation:     Interpretation: abnormal    Rate:     ECG rate:  73    ECG rate assessment: normal    Rhythm:     Rhythm: sinus rhythm    Ectopy:     Ectopy: none    QRS:     QRS axis:  Normal    QRS intervals:  Normal  Conduction:     Conduction: normal    ST segments:     ST segments:  Abnormal    Elevation:  I and aVL Depression:  II, III, aVF, V3, V4, V5 and V6  T waves:     T waves: normal      ECG 12 Lead Documentation Only    Date/Time: 1/1/2023 2:29 AM  Performed by: Ron Ny MD  Authorized by: Ron Ny MD     Indications / Diagnosis:  Chest pain  ECG reviewed by me, the ED Provider: yes    Patient location:  ED  Previous ECG:     Previous ECG:  Compared to current    Comparison ECG info:  Earlier tonight     Similarity:  No change  Rate:     ECG rate:  81    ECG rate assessment: normal    Rhythm:     Rhythm: sinus rhythm    Ectopy:     Ectopy: none    QRS:     QRS axis:  Normal    QRS intervals:  Normal  Conduction:     Conduction: normal    ST segments:     ST segments:  Abnormal    Elevation:  AVL    Depression:  II, III, aVF, V3, V4, V6 and V5  T waves:     T waves: normal    ECG 12 Lead Documentation Only    Date/Time: 1/1/2023 3:02 AM  Performed by: Ron Ny MD  Authorized by: Ron Ny MD     Indications / Diagnosis:  Chest pain  ECG reviewed by me, the ED Provider: yes    Patient location:  ED  Previous ECG:     Previous ECG:  Compared to current    Comparison ECG info:  Earlier [facility]    Similarity:  No change  Interpretation:     Interpretation: abnormal    Rate:     ECG rate:  68    ECG rate assessment: normal    Rhythm:     Rhythm: sinus rhythm    Ectopy:     Ectopy: none    QRS:     QRS axis:  Normal    QRS intervals:  Normal  Conduction:     Conduction: normal    ST segments:     ST segments:  Abnormal    Elevation:  AVL    Depression:  II, III, aVF, V3, V4, V5 and V6  T waves:     T waves: normal        Conscious Sedation Assessment    Flowsheet Row Classification Score   ASA Scale Assessment 2-Mild to moderate systemic disease, medically well controlled, with no functional limitation filed at 01/01/2023 0315             ED Course                                             Medical Decision Making  Background: 57 y o  male with chest pain    Differential DX includes but is not limited to: acs/mi, pe, pleurisy, dissection, pneumonia, musculoskeletal chest pain    Plan: cardiac workup - possible STEMI activation       Acute MI, lateral wall ([facility] ): acute illness or injury that poses a threat to life or bodily functions     Details: Patient with classic symptoms of MI with EKG ST elevation in aVL and I and depressions in II, III, aVF, V3-V6  Amount and/or Complexity of Data Reviewed  Independent Historian: spouse     Details: Spouse relayed medical history information including htn on losartan  Labs: ordered  Decision-making details documented in ED Course  Radiology: ordered  Decision-making details documented in ED Course  ECG/medicine tests: ordered  Decision-making details documented in ED Course  Discussion of management or test interpretation with external provider(s): I discussed this case with Dr Elvira Wooten of interventional cardiology and shared an image of the EKG  He agreed that this was concerning for a MI and the patient was taken to the cath lab for emergent intervention  There was some delay due to the fact that the cath team was finishing with another patient  Risk  Decision regarding hospitalization  Emergency major surgery      Risk Details: Patient required emergent cardiac catheterization with revascularization of the coronary artery        Disposition  Final diagnoses:   Acute MI, lateral wall ([facility] )     Time reflects when diagnosis was documented in both MDM as applicable and the Disposition within this note     Time User Action Codes Description Comment    1/1/2023  2:52 AM Mary ALVES Add [I21 29] Acute MI, lateral wall ([facility] )     1/1/2023  3:06 AM Quique Morales Add [I21 3] STEMI (ST elevation myocardial infarction) ([facility] )     1/1/2023  3:07 AM Quique Morales Modify [I21 29] Acute MI, lateral wall [facility])       ED Disposition     None      Follow-up Information    None         Current Discharge Medication List      CONTINUE these medications which have NOT CHANGED    Details   pantoprazole (PROTONIX) 40 mg tablet Take 1 tablet (40 mg total) by mouth daily  Qty: 90 tablet, Refills: 3    Associated Diagnoses: Gastroesophageal reflux disease without esophagitis             No discharge procedures on file      PDMP Review     None          ED Provider  Electronically Signed by           Chuckie Shaw MD  01/01/23 1573

## 2023-01-01 NOTE — H&P
[address] [place] [address] 1960, 57 y o  male MRN: [id number]  Unit/Bed#: S -01 Encounter: [id number]  Primary Care Provider: No primary care provider on file  Date and time admitted to hospital: 1/1/2023  2:05 AM    * STEMI (ST elevation myocardial infarction) [facility])  Assessment & Plan  · Presented with crushing chest pain  EKG showed ST elevation in lateral leads > MI alert was called and patient underwent urgent cardiac cath by R radial artery which showed long proximal to mid LAD lesion 80 -90% s/p successful PCI with TOM placement  · Continue aspirin 81 mg daily and Brilinta 90 mg twice daily  · Continue Lopressor 25 mg twice daily  · Continue atorvastatin 80 mg daily  · Obtain echocardiogram  · Appreciate cardiology recommendations    Elevated LFTs  Assessment & Plan  · Secondary to STEMI  · Monitor LFTs    Leukocytosis  Assessment & Plan  · Likely reactive  · No signs of infection  Monitor off antibiotics    Hypokalemia  Assessment & Plan  · Replaced  · Continue to monitor    Mixed hyperlipidemia  Assessment & Plan  · Start atorvastatin 80 mg daily    GERD (gastroesophageal reflux disease)  Assessment & Plan  · Continue pantoprazole 40 mg daily         Reason for Admission / Chief Complaint: CP     History of Present Illness:  Bunny Obrien is a 57 y o  male with past medical history of GERD who presents with crushing chest pain for 1 hour radiated to the jaw with associated diaphoresis and nausea  EKG showed ST elevation in lateral leads for which MI alert was called patient underwent urgent cardiac cath by R radial artery which showed long proximal to mid LAD lesion 80 -90% s/p successful PCI with OTM placement  Patient reports significant improvement of his chest pain  History obtained from the patient  Past Medical History:  History reviewed  No pertinent past medical history  Past Surgical History:  History reviewed   No pertinent surgical history  Past Family History:  History reviewed  No pertinent family history  Social History:  E-Cigarette/Vaping   • E-Cigarette Use Never User      E-Cigarette/Vaping Substances     Social History     Tobacco Use   Smoking Status Never   Smokeless Tobacco Never     Social History     Substance and Sexual Activity   Alcohol Use Yes    Comment: occ     Social History     Substance and Sexual Activity   Drug Use Yes     Marital Status: /Civil Union  Exercise History: na     Medications:  Current Facility-Administered Medications   Medication Dose Route Frequency   • acetaminophen (TYLENOL) tablet 650 mg  650 mg Oral Q4H PRN   • aluminum-magnesium hydroxide-simethicone (MYLANTA) oral suspension 30 mL  30 mL Oral Q4H PRN   • aspirin chewable tablet 81 mg  81 mg Oral Daily   • atorvastatin (LIPITOR) tablet 80 mg  80 mg Oral QPM   • [START ON 1/2/2023] enoxaparin (LOVENOX) subcutaneous injection 40 mg  40 mg Subcutaneous Q24H ABE   • metoprolol tartrate (LOPRESSOR) tablet 25 mg  25 mg Oral Q12H [address]   • pantoprazole (PROTONIX) EC tablet 40 mg  40 mg Oral Early Morning   • sodium chloride (PF) 0 9 % injection 3 mL  3 mL Intravenous Q1H PRN   • sodium chloride 0 9 % infusion  100 mL/hr Intravenous Continuous   • ticagrelor (BRILINTA) tablet 90 mg  90 mg Oral BID     Home medications:  Prior to Admission medications    Medication Sig Start Date End Date Taking? Authorizing Provider   pantoprazole (PROTONIX) 40 mg tablet Take 1 tablet (40 mg total) by mouth daily 1/6/21   Irving Gardner MD     Allergies: Allergies   Allergen Reactions   • Pollen Extract Allergic Rhinitis        ROS:   Review of Systems   Constitutional: Negative for chills and fever  HENT: Negative for ear pain and sore throat  Eyes: Negative for pain and visual disturbance  Respiratory: Negative for cough and shortness of breath  Cardiovascular: Positive for chest pain  Negative for palpitations     Gastrointestinal: Negative for abdominal pain and vomiting  Genitourinary: Negative for dysuria and hematuria  Musculoskeletal: Negative for arthralgias and back pain  Skin: Negative for color change and rash  Neurological: Negative for seizures and syncope  All other systems reviewed and are negative  Vitals:  Vitals:    23 0215 23 0230 23 0258 23 0411   BP:  135/82 112/63 121/75   BP Location:  Right arm Left arm Left arm   Pulse:  78 71 70   Resp:  18 16    Temp:    99 °F (37 2 °C)   TempSrc:    Oral   SpO2:  100% 98%    Weight: 96 9 kg (213 lb 10 oz)        Temperature:   Temp (24hrs), Av 8 °F (37 1 °C), Min:98 5 °F (36 9 °C), Max:99 °F (37 2 °C)    Current: Temperature: 99 °F (37 2 °C)     Weights: There is no height or weight on file to calculate BMI  Hemodynamic Monitoring:  N/A     Non-Invasive/Invasive Ventilation Settings:  Respiratory    Lab Data (Last 4 hours)    None         O2/Vent Data (Last 4 hours)    None              No results found for: PHART, OOQ3PLF, PO2ART, SZE8IFS, S0ESHFVI, BEART, SOURCE  SpO2: SpO2: 98 %     Physical Exam:  Physical Exam  Vitals and nursing note reviewed  Constitutional:       General: He is not in acute distress  Appearance: He is well-developed  HENT:      Head: Normocephalic and atraumatic  Mouth/Throat:      Mouth: Mucous membranes are moist    Eyes:      Conjunctiva/sclera: Conjunctivae normal    Cardiovascular:      Rate and Rhythm: Normal rate and regular rhythm  Heart sounds: No murmur heard  Pulmonary:      Effort: Pulmonary effort is normal  No respiratory distress  Breath sounds: Normal breath sounds  Abdominal:      Palpations: Abdomen is soft  Tenderness: There is no abdominal tenderness  Musculoskeletal:         General: No swelling  Cervical back: Neck supple  Skin:     General: Skin is warm and dry  Capillary Refill: Capillary refill takes less than 2 seconds     Neurological:      General: No focal deficit present  Mental Status: He is alert and oriented to person, place, and time  Psychiatric:         Mood and Affect: Mood normal          Behavior: Behavior normal           Labs:  Results from last 7 days   Lab Units 01/01/23  0217   WBC Thousand/uL 12 33*   HEMOGLOBIN g/dL 17 2*   HEMATOCRIT % 48 8   PLATELETS Thousands/uL 247   MONO PCT % 5      Results from last 7 days   Lab Units 01/01/23  0217   SODIUM mmol/L 136   POTASSIUM mmol/L 3 4*   CHLORIDE mmol/L 99   CO2 mmol/L 23   BUN mg/dL 23   CREATININE mg/dL 1 00   CALCIUM mg/dL 10 2   ALK PHOS U/L 89   ALT U/L 64*   AST U/L 32     Results from last 7 days   Lab Units 01/01/23  0217   MAGNESIUM mg/dL 2 2          Results from last 7 days   Lab Units 01/01/23  0217   INR  0 89   PTT seconds 24         No results found for: TROPONINI     Imaging:  I have personally reviewed pertinent reports  EKG: STEMI  Micro:  No results found for: Cornelius Kwan, WOUNDCULT, SPUTUMCULTUR  Invasive lines and devices: Invasive Devices     Peripheral Intravenous Line  Duration           Peripheral IV 01/01/23 Left;Ventral (anterior) Forearm <1 day    Peripheral IV 01/01/23 Right Antecubital <1 day                 Code Status: No Order  POA:    POLST:       Given critical illness, patient length of stay will require greater than two midnights  Counseling / Coordination of Care  Total time spent today 30 minutes  Greater than 50% of total time was spent with the patient and / or family counseling and / or coordination of care  Portions of the record may have been created with voice recognition software  Occasional wrong word or "sound a like" substitutions may have occurred due to the inherent limitations of voice recognition software  Read the chart carefully and recognize, using context, where substitutions have occurred          Faith Regalado MD

## 2023-01-01 NOTE — PROGRESS NOTES
Cardiac cath performed via the R radial artery      Long proximal to mid LAD lesion - 80 - 90 % hazy    Successful PCI with placement of a 2 75 x 33 mm TOM post dilated to 3 36 mm

## 2023-01-02 ENCOUNTER — APPOINTMENT (INPATIENT)
Dept: NON INVASIVE DIAGNOSTICS | Facility: HOSPITAL | Age: 63
End: 2023-01-02

## 2023-01-02 VITALS
SYSTOLIC BLOOD PRESSURE: 110 MMHG | TEMPERATURE: 98.2 F | HEART RATE: 67 BPM | DIASTOLIC BLOOD PRESSURE: 63 MMHG | HEIGHT: 68 IN | OXYGEN SATURATION: 97 % | RESPIRATION RATE: 16 BRPM | WEIGHT: 213 LBS | BODY MASS INDEX: 32.28 KG/M2

## 2023-01-02 LAB
ANION GAP SERPL CALCULATED.3IONS-SCNC: 7 MMOL/L (ref 4–13)
AORTIC ROOT: 3.5 CM
APICAL FOUR CHAMBER EJECTION FRACTION: 51 %
ASCENDING AORTA: 3.6 CM
BUN SERPL-MCNC: 16 MG/DL (ref 5–25)
CALCIUM SERPL-MCNC: 9.4 MG/DL (ref 8.4–10.2)
CHLORIDE SERPL-SCNC: 104 MMOL/L (ref 96–108)
CO2 SERPL-SCNC: 25 MMOL/L (ref 21–32)
CREAT SERPL-MCNC: 0.93 MG/DL (ref 0.6–1.3)
E WAVE DECELERATION TIME: 228 MS
FRACTIONAL SHORTENING: 39 % (ref 28–44)
GFR SERPL CREATININE-BSD FRML MDRD: 86 ML/MIN/1.73SQ M
GLUCOSE SERPL-MCNC: 126 MG/DL (ref 65–140)
INTERVENTRICULAR SEPTUM IN DIASTOLE (PARASTERNAL SHORT AXIS VIEW): 1.2 CM
INTERVENTRICULAR SEPTUM: 1.2 CM (ref 0.6–1.1)
LAAS-AP2: 13.8 CM2
LAAS-AP4: 19.1 CM2
LEFT ATRIUM SIZE: 3.9 CM
LEFT INTERNAL DIMENSION IN SYSTOLE: 2.7 CM (ref 2.1–4)
LEFT VENTRICLE DIASTOLIC VOLUME (MOD BIPLANE): 103 ML
LEFT VENTRICLE SYSTOLIC VOLUME (MOD BIPLANE): 47 ML
LEFT VENTRICULAR INTERNAL DIMENSION IN DIASTOLE: 4.4 CM (ref 3.5–6)
LEFT VENTRICULAR POSTERIOR WALL IN END DIASTOLE: 1 CM
LEFT VENTRICULAR STROKE VOLUME: 61 ML
LV EF: 54 %
LVSV (TEICH): 61 ML
MV E'TISSUE VEL-SEP: 12 CM/S
MV PEAK A VEL: 0.62 M/S
MV PEAK E VEL: 72 CM/S
MV STENOSIS PRESSURE HALF TIME: 66 MS
MV VALVE AREA P 1/2 METHOD: 3.33 CM2
POTASSIUM SERPL-SCNC: 4.2 MMOL/L (ref 3.5–5.3)
RIGHT ATRIUM AREA SYSTOLE A4C: 16.6 CM2
RIGHT VENTRICLE ID DIMENSION: 4 CM
SL CV LEFT ATRIUM LENGTH A2C: 4.9 CM
SL CV LV EF: 55
SL CV PED ECHO LEFT VENTRICLE DIASTOLIC VOLUME (MOD BIPLANE) 2D: 88 ML
SL CV PED ECHO LEFT VENTRICLE SYSTOLIC VOLUME (MOD BIPLANE) 2D: 27 ML
SODIUM SERPL-SCNC: 136 MMOL/L (ref 135–147)
TR MAX PG: 20 MMHG
TR PEAK VELOCITY: 2.2 M/S
TRICUSPID VALVE PEAK REGURGITATION VELOCITY: 2.21 M/S

## 2023-01-02 RX ORDER — ACETAMINOPHEN 325 MG/1
650 TABLET ORAL EVERY 6 HOURS PRN
Refills: 0
Start: 2023-01-02

## 2023-01-02 RX ORDER — NITROGLYCERIN 0.4 MG/1
0.4 TABLET SUBLINGUAL
Qty: 20 TABLET | Refills: 0 | Status: SHIPPED | OUTPATIENT
Start: 2023-01-02

## 2023-01-02 RX ORDER — ISOSORBIDE MONONITRATE 30 MG/1
30 TABLET, EXTENDED RELEASE ORAL DAILY
Qty: 30 TABLET | Refills: 0 | Status: SHIPPED | OUTPATIENT
Start: 2023-01-03 | End: 2023-01-05

## 2023-01-02 RX ORDER — LEVOTHYROXINE SODIUM 0.05 MG/1
50 TABLET ORAL DAILY
Refills: 0
Start: 2023-01-02

## 2023-01-02 RX ORDER — ATORVASTATIN CALCIUM 80 MG/1
80 TABLET, FILM COATED ORAL EVERY EVENING
Qty: 30 TABLET | Refills: 0 | Status: SHIPPED | OUTPATIENT
Start: 2023-01-02

## 2023-01-02 RX ORDER — ASPIRIN 81 MG/1
81 TABLET, CHEWABLE ORAL DAILY
Qty: 30 TABLET | Refills: 0 | Status: SHIPPED | OUTPATIENT
Start: 2023-01-03

## 2023-01-02 RX ORDER — LOSARTAN POTASSIUM 50 MG/1
50 TABLET ORAL DAILY
Qty: 30 TABLET | Refills: 0 | Status: SHIPPED | OUTPATIENT
Start: 2023-01-03 | End: 2023-01-05

## 2023-01-02 RX ORDER — ACETAMINOPHEN 325 MG/1
650 TABLET ORAL EVERY 6 HOURS PRN
Status: DISCONTINUED | OUTPATIENT
Start: 2023-01-02 | End: 2023-01-02 | Stop reason: HOSPADM

## 2023-01-02 RX ORDER — METOPROLOL TARTRATE 50 MG/1
50 TABLET, FILM COATED ORAL EVERY 12 HOURS SCHEDULED
Qty: 60 TABLET | Refills: 0 | Status: SHIPPED | OUTPATIENT
Start: 2023-01-02 | End: 2023-01-05

## 2023-01-02 RX ADMIN — ASPIRIN 81 MG CHEWABLE TABLET 81 MG: 81 TABLET CHEWABLE at 08:41

## 2023-01-02 RX ADMIN — ENOXAPARIN SODIUM 40 MG: 40 INJECTION SUBCUTANEOUS at 08:41

## 2023-01-02 RX ADMIN — PANTOPRAZOLE SODIUM 40 MG: 40 TABLET, DELAYED RELEASE ORAL at 05:05

## 2023-01-02 RX ADMIN — TICAGRELOR 90 MG: 90 TABLET ORAL at 08:41

## 2023-01-02 RX ADMIN — ACETAMINOPHEN 650 MG: 325 TABLET, FILM COATED ORAL at 09:02

## 2023-01-02 RX ADMIN — LOSARTAN POTASSIUM 50 MG: 50 TABLET, FILM COATED ORAL at 08:41

## 2023-01-02 RX ADMIN — METOPROLOL TARTRATE 50 MG: 50 TABLET, FILM COATED ORAL at 08:41

## 2023-01-02 RX ADMIN — ISOSORBIDE MONONITRATE 30 MG: 30 TABLET, EXTENDED RELEASE ORAL at 08:41

## 2023-01-02 NOTE — UTILIZATION REVIEW
Initial Clinical Review    Admission: Date/Time/Statement:   Admission Orders (From admission, onward)     Ordered        01/01/23 0649  Inpatient Admission  Once                      Orders Placed This Encounter   Procedures   • Inpatient Admission     Standing Status:   Standing     Number of Occurrences:   1     Order Specific Question:   Level of Care     Answer:   Level 1 Stepdown [13]     Order Specific Question:   Estimated length of stay     Answer:   More than 2 Midnights     Order Specific Question:   Certification     Answer:   I certify that inpatient services are medically necessary for this patient for a duration of greater than two midnights  See H&P and MD Progress Notes for additional information about the patient's course of treatment  ED Arrival Information     Expected   -    Arrival   1/1/2023 02:02    Acuity   Emergent            Means of arrival   Walk-In    Escorted by   Spouse    Service   Hospitalist    Admission type   Emergency            Arrival complaint   CHEST PAIN RADIATING TO JAW           Chief Complaint   Patient presents with   • Chest Pain     Patient reports waking from chest half an hour ago with no complaints of prior cardiac Hx  Initial Presentation: 57 y o  male from home to ED admitted inpatient due to STEMI  Presented due to chest pain with nausea, clammy skin starting 30 minutes prior to arrival   On exam: in distress  Diaphoretic  Wbc 12 33  Hs Tnl 6    K 3 4  Ecg showed ST elevation I and aVL, Depression in II, III, aVF, V3-V6  In the ED given asa,  Zofran, ntg, Heparin bolus and Ticagrelor  Taken to Cath lab  Cath showed Long proximal to mid LAD lesion - 80 - 90 % hazy  Drug eluding stent placed  Plan is telemetry, DAPT, Statin, BB, increased losartan  Cardiology to follow  1/1/23 per Cardiology - patient with lateral STEMI  Found to habe long mid LAD stenosis and treated with drug eluting stent  To continue dual antiplatelet therapy  Continue asa, atorvastatin, metoprolol  Start statin  Increase home losartan for hpt and increase metoprolol  Date: 1/2/23  Day 2:  Yesterday with residual chest pain relieved with ntg  Today pain free  Exam non focal   Continue DAPT, atorvastatin, metoprolol     ED Triage Vitals   Temperature Pulse Respirations Blood Pressure SpO2   01/01/23 0211 01/01/23 0211 01/01/23 0211 01/01/23 0211 01/01/23 0211   98 5 °F (36 9 °C) 75 18 141/70 100 %      Temp Source Heart Rate Source Patient Position - Orthostatic VS BP Location FiO2 (%)   01/01/23 0210 01/01/23 0211 01/01/23 0211 01/01/23 0211 --   Oral Monitor Lying Right arm       Pain Score       01/01/23 0258       3          Wt Readings from Last 1 Encounters:   01/01/23 96 9 kg (213 lb 10 oz)     Additional Vital Signs:   01/02/23 0730 98 2 °F (36 8 °C) 64 16 110/63 -- 97 % -- --   01/02/23 0225 98 1 °F (36 7 °C) 65 18 112/63 79 95 % None (Room air) Lying   01/01/23 2230 -- 69 -- 134/70 -- -- -- --   01/01/23 1925 98 1 °F (36 7 °C) 60 18 137/74 100 -- -- Lying   01/01/23 1800 -- 60 -- 151/73 105 98 % -- --   01/01/23 1700 -- 66 -- 153/89 115 97 % -- --   01/01/23 1600 -- 64 -- 152/76 107 97 % -- --   01/01/23 1500 99 °F (37 2 °C) 68 18 156/85 115 97 % None (Room air) Lying   01/01/23 1100 97 9 °F (36 6 °C) 61 18 158/87 117 98 % None (Room air) Lying   01/01/23 1000 -- 70 -- 163/93 120 98 % -- --   01/01/23 0900 -- 66 -- 154/96 120 96 % -- --   01/01/23 0700 97 7 °F (36 5 °C) 76 18 143/93 113 99 % None (Room air) Lying   01/01/23 04:11:36 99 °F (37 2 °C) 70 -- 121/75 92 -- -- Lying   01/01/23 0258 -- 71 16 112/63 82 98 % None (Room air)      Pertinent Labs/Diagnostic Test Results:   XR chest 1 view portable   Final Result by Anneliese Messer MD (01/01 1172)      No acute cardiopulmonary disease                    Workstation performed: [id number]         1/1/23 ecg Normal sinus rhythm  Lateral injury pattern  ** ** ACUTE MI / STEMI ** **  Abnormal ECG  No previous ECGs available    1/1/23 ecg Normal sinus rhythm  Low voltage QRS  Lateral injury pattern  ** ** ACUTE MI / STEMI ** **  Abnormal ECG  When compared with ECG of 01-JAN-2023 02:29, (unconfirmed)  No significant change was found    1/1/23 Cardiac cath Left Anterior Descending The vessel was visualized by angiography and is moderate in size  Mid LAD lesion is 85% stenosed  Culprit lesion  Culprit for clinical presentation  DANYELL flow is 2  The lesion is non high-C, irregular, thrombotic and diffuse  •  Drug-eluting stent was successfully placed  The stent used was a STENT XIENCE SKYPOINT 2 75 X 33MM  •  The intervention was successful  The guidewire crossed the lesion  Post-intervention DANYELL flow is 3  Lesion had 33 mm of its length treated  There were no complications  There is a 0% residual stenosis post intervention  1/2/23 Left Ventricle: Left ventricular cavity size is normal  Wall thickness is mildly increased  The left ventricular ejection fraction is 55%  Systolic function is normal  Diastolic function is normal   •  The following segments are hypokinetic: mid anterior, mid anterolateral, apical anterior and apical lateral   •  All other segments are normal   •  Mitral Valve: There is trace regurgitation  •  Tricuspid Valve: There is trace regurgitation    •  Pulmonary Artery: The pulmonary artery systolic pressure is normal     Results from last 7 days   Lab Units 01/01/23  0217   WBC Thousand/uL 12 33*   HEMOGLOBIN g/dL 17 2*   HEMATOCRIT % 48 8   PLATELETS Thousands/uL 247   BANDS PCT % 6     Results from last 7 days   Lab Units 01/02/23  0500 01/01/23  0641 01/01/23  0217   SODIUM mmol/L 136 134* 136   POTASSIUM mmol/L 4 2 4 0 3 4*   CHLORIDE mmol/L 104 99 99   CO2 mmol/L 25 24 23   ANION GAP mmol/L 7 11 14*   BUN mg/dL 16 22 23   CREATININE mg/dL 0 93 0 90 1 00   EGFR ml/min/1 73sq m 86 89 79   CALCIUM mg/dL 9 4 9 3 10 2   MAGNESIUM mg/dL  --   --  2 2     Results from last 7 days   Lab Units 01/01/23  0217   AST U/L 32   ALT U/L 64*   ALK PHOS U/L 89   TOTAL PROTEIN g/dL 7 2   ALBUMIN g/dL 4 2   TOTAL BILIRUBIN mg/dL 0 47     Results from last 7 days   Lab Units 01/02/23  0500 01/01/23  0641 01/01/23  0217   GLUCOSE RANDOM mg/dL 126 119 88     Results from last 7 days   Lab Units 01/01/23  0641 01/01/23  0217   HEMOGLOBIN A1C % 5 4 5 5   EAG mg/dl 108 111     Results from last 7 days   Lab Units 01/01/23  0641 01/01/23  0454 01/01/23  0217   HS TNI 0HR ng/L  --   --  6   HS TNI 2HR ng/L  --  230*  --    HSTNI D2 ng/L  --  224*  --    HS TNI 4HR ng/L 842*  --   --    HSTNI D4 ng/L 836*  --   --      Results from last 7 days   Lab Units 01/01/23  0217   PROTIME seconds 12 2   INR  0 89   PTT seconds 24     Results from last 7 days   Lab Units 01/01/23  0641   TSH 3RD GENERATON uIU/mL 3 714       ED Treatment:   Medication Administration from 01/01/2023 0202 to 01/01/2023 0310       Date/Time Order Dose Route Action Comments     01/01/2023 0219 EST aspirin chewable tablet 324 mg 324 mg Oral Given --     01/01/2023 0219 EST ondansetron (ZOFRAN) injection 4 mg 4 mg Intravenous Given --     01/01/2023 0248 EST nitroglycerin (NITROSTAT) SL tablet 0 4 mg 0 4 mg Sublingual Given --     01/01/2023 0254 EST heparin (porcine) injection 4,000 Units 4,000 Units Intravenous Given --     01/01/2023 0253 EST ticagrelor (BRILINTA) tablet 180 mg 180 mg Oral Given --        History reviewed  No pertinent past medical history    Present on Admission:  **None**      Admitting Diagnosis: Chest pain [R07 9]  STEMI (ST elevation myocardial infarction) (HCC) [I21 3]  Acute MI, lateral wall (HCC) [I21 29]  Age/Sex: 57 y o  male  Admission Orders: 1/1/23 0649 inpatient   Scheduled Medications:  aspirin, 81 mg, Oral, Daily  atorvastatin, 80 mg, Oral, QPM  enoxaparin, 40 mg, Subcutaneous, Q24H ABE  isosorbide mononitrate, 30 mg, Oral, Daily  losartan, 50 mg, Oral, Daily  metoprolol tartrate, 50 mg, Oral, Q12H ABE  pantoprazole, 40 mg, Oral, Early Morning  ticagrelor, 90 mg, Oral, BID    metoprolol tartrate (LOPRESSOR) tablet 25 mg  Dose: 25 mg  Freq: Every 12 hours scheduled Route: PO  Start: 01/01/23 0900 End: 01/01/23 1212    Continuous IV Infusions:  sodium chloride 0 9 % infusion  Rate: 100 mL/hr Dose: 100 mL/hr  Freq: Continuous Route: IV  Indications of Use: IV Hydration  Last Dose: Stopped (01/01/23 0724)  Start: 01/01/23 0445 End: 01/01/23 0653     PRN Meds:  acetaminophen, 650 mg, Oral, Q6H PRN- x 1 1/2/23  aluminum-magnesium hydroxide-simethicone, 30 mL, Oral, Q4H PRN- x 2 1/1/23  famotidine, 20 mg, Oral, BID PRN- x 1 1/1/23  nitroglycerin, 0 4 mg, Sublingual, Q5 Min PRN - x 2 1/1/23  sodium chloride (PF), 3 mL, Intravenous, Q1H PRN    Telemetry   Oxygen 2 liters - sat > 92%  IP CONSULT TO CARDIOLOGY      Network Utilization Review Department  ATTENTION: Please call with any questions or concerns to [phone] and carefully listen to the prompts so that you are directed to the right person  All voicemails are confidential   Garcia Lucinda all requests for admission clinical reviews, approved or denied determinations and any other requests to dedicated fax number below belonging to the campus where the patient is receiving treatment   List of dedicated fax numbers for the Facilities:  [address] DENIALS (Administrative/Medical Necessity) [phone]   [address] (Maternity/NICU/Pediatrics) [phone]   [address] [phone]   [place] [phone]   [place] [phone]   [address] [address] [facility] [place] [id number] [place] [address] [address]   [address] [place] [phone]   [facility] [address]  [address] [facility] [phone]

## 2023-01-02 NOTE — DISCHARGE SUMMARY
[facility]  Discharge- Jamison Wylie 1960, 57 y o  male MRN: [id number]  Unit/Bed#: S -01 Encounter: [id number]  Primary Care Provider: No primary care provider on file  Date and time admitted to hospital: 1/1/2023  2:05 AM    * STEMI (ST elevation myocardial infarction) [facility])  Assessment & Plan  · Presented with chest pain and elevated troponin   · EKG showed ST elevations in the lateral leads with ST depressions inferiorly  · He was taken to the cardiac catheterization laboratory and he found to have a significant long mid LAD lesion in which he was successfully treated with a drug-eluting stent  · He has had no further chest pain  · 2d echo being done  · Continue dual antiplatelet therapy plus beta-blocker and statin    Hypokalemia  Assessment & Plan  · repleted    Mixed hyperlipidemia  Assessment & Plan  lipitor    GERD (gastroesophageal reflux disease)  Assessment & Plan  pepcid    Medical Problems     Resolved Problems  Date Reviewed: 1/2/2023   None       Discharging Physician / Practitioner: Melyssa Cosme PA-C  PCP: No primary care provider on file  Admission Date:   Admission Orders (From admission, onward)     Ordered        01/01/23 0649  Inpatient Admission  Once                      Discharge Date: 01/02/23    Consultations During Hospital Stay:  · cardiology    Procedures Performed:   · Cardiac cath  · echo    Significant Findings / Test Results:   · As above    Incidental Findings:   · none    Test Results Pending at Discharge (will require follow up):   · none     Outpatient Tests Requested:  ·     Complications:      Reason for Admission: chest pain    Hospital Course:   Jamison Wylie is a 57 y o  male patient who originally presented to the hospital on 1/1/2023 due to chest pain with elevated troponin and abnormal EKG  he went directly to cardiac catheterization lab and was found to have an LAD lesion which required stenting    He had some chest pain postprocedure which did resolve with morphine and felt well the next day  Please see above list of diagnoses and related plan for additional information  Condition at Discharge: stable    Discharge Day Visit / Exam:   Subjective:    Vitals: Blood Pressure: 110/63 (01/02/23 0730)  Pulse: 64 (01/02/23 0730)  Temperature: 98 2 °F (36 8 °C) (01/02/23 0730)  Temp Source: Oral (01/02/23 0730)  Respirations: 16 (01/02/23 0730)  Weight - Scale: 96 9 kg (213 lb 10 oz) (01/01/23 0215)  SpO2: 97 % (01/02/23 0730)  Exam:   Physical Exam  Vitals reviewed  Constitutional:       General: He is not in acute distress  Appearance: Normal appearance  He is not ill-appearing, toxic-appearing or diaphoretic  Eyes:      General: No scleral icterus  Right eye: No discharge  Left eye: No discharge  Conjunctiva/sclera: Conjunctivae normal    Cardiovascular:      Rate and Rhythm: Normal rate and regular rhythm  Heart sounds: No murmur heard  Pulmonary:      Effort: No respiratory distress  Breath sounds: Normal breath sounds  No stridor  No wheezing, rhonchi or rales  Abdominal:      General: There is no distension  Tenderness: There is no abdominal tenderness  There is no guarding  Musculoskeletal:      Right lower leg: No edema  Left lower leg: No edema  Skin:     General: Skin is warm and dry  Coloration: Skin is not jaundiced or pale  Findings: No bruising, erythema, lesion or rash  Neurological:      General: No focal deficit present  Mental Status: He is alert  Mental status is at baseline  Psychiatric:         Mood and Affect: Mood normal          Thought Content: Thought content normal           Discussion with Family: Updated  (wife) via phone  Discharge instructions/Information to patient and family:   See after visit summary for information provided to patient and family        Provisions for Follow-Up Care:  See after visit summary for information related to follow-up care and any pertinent home health orders  Disposition:   Home    Planned Readmission: None     Discharge Statement:  I spent 35 minutes discharging the patient  This time was spent on the day of discharge  I had direct contact with the patient on the day of discharge  Greater than 50% of the total time was spent examining patient, answering all patient questions, arranging and discussing plan of care with patient as well as directly providing post-discharge instructions  Additional time then spent on discharge activities  Case discussed with cardiology  Bedside nursing rounds performed    Discharge Medications:  See after visit summary for reconciled discharge medications provided to patient and/or family        **Please Note: This note may have been constructed using a voice recognition system**

## 2023-01-02 NOTE — DISCHARGE INSTR - AVS FIRST PAGE
Dear Madelyn Oglesby,     It was our pleasure to care for you here at [facility]  It is our hope that we were always able to exceed the expected standards for your care during your stay  You were hospitalized due to heart attack  You were cared for on the 3rd floor by Vanessa Guerrero PA-C under the service of Josh Cardona MD with the [facility] who covers for your primary care physician (PCP), No primary care provider on file  , while you were hospitalized  If you have any questions or concerns related to this hospitalization, you may contact us at [phone]  For follow up as well as any medication refills, we recommend that you follow up with your primary care physician  A registered nurse will reach out to you by phone within a few days after your discharge to answer any additional questions that you may have after going home  However, at this time we provide for you here, the most important instructions / recommendations at discharge:     Notable Medication Adjustments -   Aspirin, brilinta, lipitor, imdur, cozaar, metoprolol  Testing Required after Discharge -   Blood pressure  Important follow up information -   Cardiology  Family doctor  Other Instructions -   Heart healthy diet  Post cath instructions  Please review this entire after visit summary as additional general instructions including medication list, appointments, activity, diet, any pertinent wound care, and other additional recommendations from your care team that may be provided for you  Sincerely,     Vanessa Guerrero PA-C         1  Please see the post cardiac catheterization/ angioseal closure device instructions and stent booklet  No heavy lifting, greater than 10 lbs  or strenuous  activity for 48 hrs  See the post cardiac catheterization/ angioseal closure device instructions  2 Remove band aid tomorrow    Shower and wash area- wrist gently with soap and water- beginning tomorrow  Rinse and pat dry  Apply new water seal band aid  Repeat this process for 5 days  No powders, creams lotions or antibiotic ointments  for 5 days  No tub baths, hot tubs or swimming for 5 days  3 No driving for 2 days    4  Do not stop aspirin or Brilinta (ticagrelor) for any reason without a cardiologist’s consent, or the stent could block up and cause a heart attack

## 2023-01-02 NOTE — PROGRESS NOTES
Cardiology Progress Note - Peggy Kemp 57 y o  male MRN: [id number]    Unit/Bed#: S -01 Encounter: [id number]      Assessment:  Principal Problem:    STEMI (ST elevation myocardial infarction) ([address] )  Active Problems:    GERD (gastroesophageal reflux disease)    Mixed hyperlipidemia    Hypokalemia    Leukocytosis    Elevated LFTs      Plan:    1  Lateral STEMI - Les Arrow was found to have a significant long mid LAD stenosis which was successfully treated with a drug-eluting stent  Continue dual antiplatelet therapy for a minimum of 1 year  Continue aspirin, atorvastatin and metoprolol  We will review his echocardiogram, and anticipate discharge later today  Continue Imdur for now but can likely be discontinued early in the outpatient setting      2  Mixed hyperlipidemia with hypertriglyceridemia - For now we started statin therapy with atorvastatin  We will check his lipid profile closely into the outpatient setting to determine if we would need to add a fenofibrate      3  Hypertension - He is on losartan at home  We will increase this to 50 mg daily and uptitrate metoprolol to 50 mg twice daily  His blood pressure remains elevated        Subjective:   Patient seen and examined  No significant events overnight  Patient did have some residual chest pain yesterday that was relieved with nitroglycerin  Started on Imdur  Today is chest pain-free  Objective:     Vitals: Blood pressure 110/63, pulse 64, temperature 98 2 °F (36 8 °C), temperature source Oral, resp  rate 16, weight 96 9 kg (213 lb 10 oz), SpO2 97 %  , There is no height or weight on file to calculate BMI ,   Orthostatic Blood Pressures    Flowsheet Row Most Recent Value   Blood Pressure 110/63 filed at 01/02/2023 0730   Patient Position - Orthostatic VS Lying filed at 01/02/2023 0225            Intake/Output Summary (Last 24 hours) at 1/2/2023 1039  Last data filed at 1/2/2023 0730  Gross per 24 hour   Intake 0 ml   Output 2050 ml   Net -2050 ml       Telemetry review: Sinus rhythm or sinus bradycardia with rare PVCs  Physical Exam:    GEN: Stephenie [place] appears well, alert and oriented x 3, pleasant and cooperative   HEENT: pupils equal, round, and reactive to light; extraocular muscles intact  NECK: supple, no carotid bruits   HEART: regular rhythm, normal S1 and S2, no murmurs, clicks, gallops or rubs   LUNGS: clear to auscultation bilaterally; no wheezes, rales, or rhonchi   ABDOMEN: normal bowel sounds, soft, no tenderness, no distention  EXTREMITIES: peripheral pulses normal; no clubbing, cyanosis, or edema  Cath site normal with good pulses    NEURO: no focal findings   SKIN: normal without suspicious lesions on exposed skin    Medications:      Current Facility-Administered Medications:   •  acetaminophen (TYLENOL) tablet 650 mg, 650 mg, Oral, Q6H PRN, Sidra Barlow PA-C, 650 mg at 01/02/23 0902  •  aluminum-magnesium hydroxide-simethicone (MYLANTA) oral suspension 30 mL, 30 mL, Oral, Q4H PRN, Leidy Davis PA-C, 30 mL at 01/01/23 1932  •  aspirin chewable tablet 81 mg, 81 mg, Oral, Daily, [employer], 81 mg at 01/02/23 0841  •  atorvastatin (LIPITOR) tablet 80 mg, 80 mg, Oral, QPM, Jadon Davis PA-C, 80 mg at 01/01/23 1702  •  enoxaparin (LOVENOX) subcutaneous injection 40 mg, 40 mg, Subcutaneous, Q24H [address], Jadon Davis PA-C, 40 mg at 01/02/23 1449  •  famotidine (PEPCID) tablet 20 mg, 20 mg, Oral, BID PRN, Leidy Davis PA-C, 20 mg at 01/01/23 1702  •  isosorbide mononitrate (IMDUR) 24 hr tablet 30 mg, 30 mg, Oral, Daily, ROBBY Dixon, 30 mg at 01/02/23 0841  •  losartan (COZAAR) tablet 50 mg, 50 mg, Oral, Daily, Jadon Davis PA-C, 50 mg at 01/02/23 4134  •  metoprolol tartrate (LOPRESSOR) tablet 50 mg, 50 mg, Oral, Q12H [address], Jadon Baljinder Davis PA-C, 50 mg at 01/02/23 0841  •  nitroglycerin (NITROSTAT) SL tablet 0 4 mg, 0 4 mg, Sublingual, Q5 Min PRN, [employer], YI, 0 4 mg at 01/01/23 1703  •  pantoprazole (PROTONIX) EC tablet 40 mg, 40 mg, Oral, Early Morning, Jadon Davis PA-C, 40 mg at 01/02/23 0505  •  Insert peripheral IV, , , Once **AND** sodium chloride (PF) 0 9 % injection 3 mL, 3 mL, Intravenous, Q1H PRN, Zeferino Pink PA-C  •  ticagrelor (BRILINTA) tablet 90 mg, 90 mg, Oral, BID, Zeferino Pink PA-C, 90 mg at 01/02/23 0841     Labs & Results:        Results from last 7 days   Lab Units 01/01/23  0217   WBC Thousand/uL 12 33*   HEMOGLOBIN g/dL 17 2*   HEMATOCRIT % 48 8   PLATELETS Thousands/uL 247     Results from last 7 days   Lab Units 01/01/23  0217   TRIGLYCERIDES mg/dL 899*   HDL mg/dL 39*     Results from last 7 days   Lab Units 01/02/23  0500 01/01/23  0641 01/01/23  0217   POTASSIUM mmol/L 4 2 4 0 3 4*   CHLORIDE mmol/L 104 99 99   CO2 mmol/L 25 24 23   BUN mg/dL 16 22 23   CREATININE mg/dL 0 93 0 90 1 00   CALCIUM mg/dL 9 4 9 3 10 2   ALK PHOS U/L  --   --  89   ALT U/L  --   --  64*   AST U/L  --   --  32     Results from last 7 days   Lab Units 01/01/23  0217   INR  0 89   PTT seconds 24     Results from last 7 days   Lab Units 01/01/23  0217   MAGNESIUM mg/dL 2 2         EKG personally reviewed by Sherwin Lozada MD   Sinus rhythm with lateral ST elevations  Counseling / Coordination of Care  Total floor / unit time spent today 25 minutes  Greater than 50% of total time was spent with the patient and / or family counseling and / or coordination of care

## 2023-01-02 NOTE — NURSING NOTE
MI Booklet and Heart Attack Zone tool given to patient  Reviewed education points with patient and answered questions       Ana Maria Durbin

## 2023-01-02 NOTE — ASSESSMENT & PLAN NOTE
· Presented with chest pain and elevated troponin   · EKG showed ST elevations in the lateral leads with ST depressions inferiorly  · He was taken to the cardiac catheterization laboratory and he found to have a significant long mid LAD lesion in which he was successfully treated with a drug-eluting stent     · He has had no further chest pain  · 2d echo being done  · Continue dual antiplatelet therapy plus beta-blocker and statin

## 2023-01-03 ENCOUNTER — TELEPHONE (OUTPATIENT)
Dept: CARDIOLOGY CLINIC | Facility: CLINIC | Age: 63
End: 2023-01-03

## 2023-01-04 ENCOUNTER — APPOINTMENT (EMERGENCY)
Dept: RADIOLOGY | Facility: HOSPITAL | Age: 63
End: 2023-01-04

## 2023-01-04 ENCOUNTER — HOSPITAL ENCOUNTER (OUTPATIENT)
Facility: HOSPITAL | Age: 63
Setting detail: OBSERVATION
Discharge: HOME | End: 2023-01-05
Attending: EMERGENCY MEDICINE | Admitting: INTERNAL MEDICINE

## 2023-01-04 DIAGNOSIS — I95.9 HYPOTENSION: Primary | ICD-10-CM

## 2023-01-04 DIAGNOSIS — I21.3 ST ELEVATION MYOCARDIAL INFARCTION (STEMI), UNSPECIFIED ARTERY (HCC): ICD-10-CM

## 2023-01-04 DIAGNOSIS — R53.1 GENERALIZED WEAKNESS: ICD-10-CM

## 2023-01-04 DIAGNOSIS — T50.905A MEDICATION ADVERSE EFFECT: ICD-10-CM

## 2023-01-04 DIAGNOSIS — R77.8 ELEVATED TROPONIN: ICD-10-CM

## 2023-01-04 PROBLEM — I95.2 HYPOTENSION DUE TO DRUGS: Status: ACTIVE | Noted: 2023-01-04

## 2023-01-04 LAB
2HR DELTA HS TROPONIN: 11 NG/L
4HR DELTA HS TROPONIN: -97 NG/L
ALBUMIN SERPL BCP-MCNC: 3.9 G/DL (ref 3.5–5)
ALP SERPL-CCNC: 66 U/L (ref 34–104)
ALT SERPL W P-5'-P-CCNC: 37 U/L (ref 7–52)
ANION GAP SERPL CALCULATED.3IONS-SCNC: 6 MMOL/L (ref 4–13)
AST SERPL W P-5'-P-CCNC: 35 U/L (ref 13–39)
BASOPHILS # BLD AUTO: 0.06 THOUSANDS/ÂΜL (ref 0–0.1)
BASOPHILS NFR BLD AUTO: 1 % (ref 0–1)
BILIRUB SERPL-MCNC: 1.22 MG/DL (ref 0.2–1)
BUN SERPL-MCNC: 22 MG/DL (ref 5–25)
CALCIUM SERPL-MCNC: 8.9 MG/DL (ref 8.4–10.2)
CARDIAC TROPONIN I PNL SERPL HS: 4692 NG/L
CARDIAC TROPONIN I PNL SERPL HS: 4789 NG/L
CARDIAC TROPONIN I PNL SERPL HS: 4800 NG/L
CHLORIDE SERPL-SCNC: 102 MMOL/L (ref 96–108)
CO2 SERPL-SCNC: 24 MMOL/L (ref 21–32)
CREAT SERPL-MCNC: 0.94 MG/DL (ref 0.6–1.3)
EOSINOPHIL # BLD AUTO: 0.08 THOUSAND/ÂΜL (ref 0–0.61)
EOSINOPHIL NFR BLD AUTO: 1 % (ref 0–6)
ERYTHROCYTE [DISTWIDTH] IN BLOOD BY AUTOMATED COUNT: 12.6 % (ref 11.6–15.1)
GFR SERPL CREATININE-BSD FRML MDRD: 85 ML/MIN/1.73SQ M
GLUCOSE SERPL-MCNC: 105 MG/DL (ref 65–140)
HCT VFR BLD AUTO: 43.2 % (ref 36.5–49.3)
HGB BLD-MCNC: 14.7 G/DL (ref 12–17)
IMM GRANULOCYTES # BLD AUTO: 0.18 THOUSAND/UL (ref 0–0.2)
IMM GRANULOCYTES NFR BLD AUTO: 2 % (ref 0–2)
LYMPHOCYTES # BLD AUTO: 1.54 THOUSANDS/ÂΜL (ref 0.6–4.47)
LYMPHOCYTES NFR BLD AUTO: 15 % (ref 14–44)
MCH RBC QN AUTO: 30.6 PG (ref 26.8–34.3)
MCHC RBC AUTO-ENTMCNC: 34 G/DL (ref 31.4–37.4)
MCV RBC AUTO: 90 FL (ref 82–98)
MONOCYTES # BLD AUTO: 1.12 THOUSAND/ÂΜL (ref 0.17–1.22)
MONOCYTES NFR BLD AUTO: 11 % (ref 4–12)
NEUTROPHILS # BLD AUTO: 7.12 THOUSANDS/ÂΜL (ref 1.85–7.62)
NEUTS SEG NFR BLD AUTO: 70 % (ref 43–75)
NRBC BLD AUTO-RTO: 0 /100 WBCS
PLATELET # BLD AUTO: 195 THOUSANDS/UL (ref 149–390)
PMV BLD AUTO: 9 FL (ref 8.9–12.7)
POTASSIUM SERPL-SCNC: 4.1 MMOL/L (ref 3.5–5.3)
PROT SERPL-MCNC: 6.9 G/DL (ref 6.4–8.4)
RBC # BLD AUTO: 4.81 MILLION/UL (ref 3.88–5.62)
SODIUM SERPL-SCNC: 132 MMOL/L (ref 135–147)
WBC # BLD AUTO: 10.1 THOUSAND/UL (ref 4.31–10.16)

## 2023-01-04 RX ORDER — NITROGLYCERIN 0.4 MG/1
0.4 TABLET SUBLINGUAL
Status: DISCONTINUED | OUTPATIENT
Start: 2023-01-04 | End: 2023-01-05 | Stop reason: HOSPADM

## 2023-01-04 RX ORDER — ENOXAPARIN SODIUM 100 MG/ML
40 INJECTION SUBCUTANEOUS DAILY
Status: DISCONTINUED | OUTPATIENT
Start: 2023-01-05 | End: 2023-01-05 | Stop reason: HOSPADM

## 2023-01-04 RX ORDER — LEVOTHYROXINE SODIUM 0.05 MG/1
50 TABLET ORAL
Status: DISCONTINUED | OUTPATIENT
Start: 2023-01-05 | End: 2023-01-05 | Stop reason: HOSPADM

## 2023-01-04 RX ORDER — ATORVASTATIN CALCIUM 40 MG/1
80 TABLET, FILM COATED ORAL EVERY EVENING
Status: DISCONTINUED | OUTPATIENT
Start: 2023-01-04 | End: 2023-01-05 | Stop reason: HOSPADM

## 2023-01-04 RX ORDER — ASPIRIN 81 MG/1
81 TABLET, CHEWABLE ORAL DAILY
Status: DISCONTINUED | OUTPATIENT
Start: 2023-01-05 | End: 2023-01-05 | Stop reason: HOSPADM

## 2023-01-04 RX ORDER — ACETAMINOPHEN 325 MG/1
650 TABLET ORAL EVERY 6 HOURS PRN
Status: DISCONTINUED | OUTPATIENT
Start: 2023-01-04 | End: 2023-01-05 | Stop reason: HOSPADM

## 2023-01-04 RX ORDER — LOSARTAN POTASSIUM 25 MG/1
25 TABLET ORAL DAILY
Status: DISCONTINUED | OUTPATIENT
Start: 2023-01-05 | End: 2023-01-05 | Stop reason: HOSPADM

## 2023-01-04 RX ORDER — PANTOPRAZOLE SODIUM 40 MG/1
40 TABLET, DELAYED RELEASE ORAL
Status: DISCONTINUED | OUTPATIENT
Start: 2023-01-05 | End: 2023-01-05 | Stop reason: HOSPADM

## 2023-01-04 RX ADMIN — SODIUM CHLORIDE 1000 ML: 0.9 INJECTION, SOLUTION INTRAVENOUS at 14:28

## 2023-01-04 RX ADMIN — TICAGRELOR 90 MG: 90 TABLET ORAL at 19:55

## 2023-01-04 NOTE — ED ATTENDING ATTESTATION
1/4/2023  ILorena MD, saw and evaluated the patient  I have discussed the patient with the resident/non-physician practitioner and agree with the resident's/non-physician practitioner's findings, Plan of Care, and MDM as documented in the resident's/non-physician practitioner's note, except where noted  All available labs and Radiology studies were reviewed  I was present for key portions of any procedure(s) performed by the resident/non-physician practitioner and I was immediately available to provide assistance  At this point I agree with the current assessment done in the Emergency Department  I have conducted an independent evaluation of this patient a history and physical is as follows:    Patient is a 75-year-old male who presents to the emergency department after experiencing lightheadedness today  He was seen in the emergency department on January 1 after experiencing chest discomfort and was diagnosed with STEMI  He had a stent placed in his LAD and was discharged on the second feeling improved without any chest discomfort  He has felt weak since discharge though today felt lightheaded  Blood pressure was in the 80s over 50s  Prior to that since discharge systolics had mostly been in the 110s  He has not had any recurrence of pain, no dyspnea, dizziness or nausea  He has been eating and drinking well  He has made healthier food selections since discharge home  Medications were changed while in the hospital   He was put on a higher dose statin in addition to being initiated on Brilinta and aspirin  His losartan dose was decreased and he was initiated on metoprolol 50 mg twice daily  Imdur was also initiated  Per cardiology note he had some chest discomfort following procedure for which this was started  On exam he appears well  Mucous membranes are moist   Speech is clear  He is cautious with movements-mainly staying still  Heart sounds regular    Slightly bradycardic  Lungs clear to auscultation bilaterally  No abdominal tenderness  Lower extremities nontender and nonedematous  First few blood pressures here were low with systolics in the low 36R and 80s  Concern held for hypotension secondary to recent medication adjustment with vasodilator and beta-blocker in combination with his ARB  Additionally must consider change in cardiac function/cardiomyopathy secondary to recent MI causing this  EKG much improved from that on arrival with STEMI  No specific evidence of ischemia  Troponin elevated  Significance of this uncertain  Unknown if this level is trending upward along with symptoms of lightheadedness and hypotension versus down from acute ischemic event 3 days ago  Hydrating here    Anticipate observation    ED Course         Critical Care Time  Procedures

## 2023-01-04 NOTE — ASSESSMENT & PLAN NOTE
Patient had STEMI on 1/1/2023, status post cath, drug-eluting stent placement, started on Imdur, metoprolol 50 mg twice daily, losartan 50 mg daily  Patient states his blood pressure was low normal yesterday and today after taking above-mentioned medications his sbp was low in 70s  Patient complains of some lightheadedness, in the emergency department received fluids, blood pressure is improving and currently rates up to 90s  Patient is currently asymptomatic  Apparently ED team discussed with cardiology and recommended to stop Imdur, decrease metoprolol to 25 mg twice daily  I will also lower losartan to 25 mg daily and will start from tomorrow  Patient also has elevated troponins, however no chest pain

## 2023-01-04 NOTE — ASSESSMENT & PLAN NOTE
On 1/1/2023   lateral STEMI, long mid LAD stenosis, treated with drug-eluting stent, continue aspirin, Brilinta  Continue statin    Echocardiogram on 1-23 showed EF of 55%, normal function

## 2023-01-04 NOTE — H&P
[address] [place] [address] 1960, 57 y o  male MRN: [id number]  Unit/Bed#: ED-29 Encounter: [id number]  Primary Care Provider: No primary care provider on file  Date and time admitted to hospital: 1/4/2023  1:45 PM    Hypotension due to drugs  Assessment & Plan  Patient had STEMI on 1/1/2023, status post cath, drug-eluting stent placement, started on Imdur, metoprolol 50 mg twice daily, losartan 50 mg daily  Patient states his blood pressure was low normal yesterday and today after taking above-mentioned medications his sbp was low in 70s  Patient complains of some lightheadedness, in the emergency department received fluids, blood pressure is improving and currently rates up to 90s  Patient is currently asymptomatic  Apparently ED team discussed with cardiology and recommended to stop Imdur, decrease metoprolol to 25 mg twice daily  I will also lower losartan to 25 mg daily and will start from tomorrow  Patient also has elevated troponins, however no chest pain  STEMI (ST elevation myocardial infarction) [facility])  Assessment & Plan  On 1/1/2023   lateral STEMI, long mid LAD stenosis, treated with drug-eluting stent, continue aspirin, Brilinta  Continue statin  Echocardiogram on 1-23 showed EF of 55%, normal function    Mixed hyperlipidemia  Assessment & Plan  Continue on statin    GERD (gastroesophageal reflux disease)  Assessment & Plan  Continue on pantoprazole  VTE Prophylaxis: Enoxaparin (Lovenox)  / sequential compression device   Code Status: Full code  POLST: There is no POLST form on file for this patient (pre-hospital)      Anticipated Length of Stay:  Patient will be admitted on an Observation basis with an anticipated length of stay of less than 2 midnights  Justification for Hospital Stay: Drug-induced hypotension    Total Time for Visit, including Counseling / Coordination of Care: 70 minutes    Greater than 50% of this total time spent on direct patient counseling and coordination of care  Chief Complaint:   Low blood pressure    History of Present Illness:    Inderjit Stallworth is a 57 y o  male who presents with recent MI status post drug-eluting stent, hyperlipidemia presented to the emergency department with complaints of low blood pressure and lightheadedness  Patient was started on Imdur 30 mg daily, metoprolol 50 mg twice daily, losartan 50 mg daily, today patient after taking these medications noted that his blood pressure was low in the 70s and he was feeling lightheadedness and weakness  Denies any chest pain, shortness of breath  Denies any nausea vomiting or abdominal pain  Denies any urinary or bowel complaints    Review of Systems:    More than 10 system review of systems was performed, pertinent positive and negative findings mentioned as per history present illness  Past Medical and Surgical History:     Past Medical History:   Diagnosis Date   • MI (myocardial infarction) [facility])        Past Surgical History:   Procedure Laterality Date   • CARDIAC CATHETERIZATION N/A 1/1/2023    Procedure: Cardiac catheterization;  Surgeon: Hanna Lima MD;  Location: [facility]; Service: Cardiology   • CARDIAC CATHETERIZATION N/A 1/1/2023    Procedure: Cardiac pci;  Surgeon: Hanna Lima MD;  [facility]; Service: Cardiology       Meds/Allergies:    Prior to Admission medications    Medication Sig Start Date End Date Taking? Authorizing Provider   acetaminophen (TYLENOL) 325 mg tablet Take 2 tablets (650 mg total) by mouth every 6 (six) hours as needed for mild pain 1/2/23   Christine Kelly PA-C   aspirin 81 mg chewable tablet Chew 1 tablet (81 mg total) daily Do not start before January 3, 2023   1/3/23   Sidra Barlow PA-C   atorvastatin (LIPITOR) 80 mg tablet Take 1 tablet (80 mg total) by mouth every evening 1/2/23   Sidra Barlow PA-C   isosorbide mononitrate (IMDUR) 30 mg 24 hr tablet Take 1 tablet (30 mg total) by mouth daily Do not start before January 3, 2023  1/3/23   Sidra Barlow PA-C   levothyroxine (Synthroid) 50 mcg tablet Take 1 tablet (50 mcg total) by mouth daily 1/2/23   Sidra Barlow PA-C   losartan (COZAAR) 50 mg tablet Take 1 tablet (50 mg total) by mouth daily Do not start before January 3, 2023  1/3/23   Sidra Barlow PA-C   metoprolol tartrate (LOPRESSOR) 50 mg tablet Take 1 tablet (50 mg total) by mouth every 12 (twelve) hours 1/2/23   Sidra Barlow PA-C   nitroglycerin (NITROSTAT) 0 4 mg SL tablet Place 1 tablet (0 4 mg total) under the tongue every 5 (five) minutes as needed for chest pain 1/2/23   Sidra Barlow PA-C   pantoprazole (PROTONIX) 40 mg tablet Take 1 tablet (40 mg total) by mouth daily 1/6/21   Claude Marine, MD   ticagrelor (BRILINTA) 90 MG Take 1 tablet (90 mg total) by mouth 2 (two) times a day 1/2/23   Obie Prader, PA-C     I have reviewed home medications with patient personally  Allergies:    Allergies   Allergen Reactions   • Pollen Extract Allergic Rhinitis       Social History:     Marital Status: /Civil Union     Social History     Substance and Sexual Activity   Alcohol Use Yes    Comment: occ     Social History     Tobacco Use   Smoking Status Never   Smokeless Tobacco Never     Social History     Substance and Sexual Activity   Drug Use Yes       Family History:    non-contributory    Physical Exam:     Vitals:   Blood Pressure: 97/56 (01/04/23 1600)  Pulse: 59 (01/04/23 1600)  Temperature: 98 2 °F (36 8 °C) (01/04/23 1327)  Respirations: 18 (01/04/23 1600)  SpO2: 99 % (01/04/23 1600)    Physical Exam    General appearance:  Patient not in acute distress  Eyes:  Pupils equal reacting to light  ENT:  Moist oral mucous membranes  CVS:  S1-S2 heard, regular rate and rhythm, no pedal edema  Chest:  Bilateral air entry present, clear to auscultation  Abdomen:  Soft, nontender, bowel sounds present  CNS:  No focal neurological deficits  Genitourinary: deferred  Skin:  No acute rash   psychiatric:  No psychosis  Musculoskeletal:  No joint deformities      Additional Data:     Lab Results: I have personally reviewed pertinent reports  Results from last 7 days   Lab Units 01/04/23  1340 01/01/23  0217   WBC Thousand/uL 10 10 12 33*   HEMOGLOBIN g/dL 14 7 17 2*   HEMATOCRIT % 43 2 48 8   PLATELETS Thousands/uL 195 247   BANDS PCT %  --  6   NEUTROS PCT % 70  --    LYMPHS PCT % 15  --    LYMPHO PCT %  --  30   MONOS PCT % 11  --    MONO PCT %  --  5   EOS PCT % 1 2     Results from last 7 days   Lab Units 01/04/23  1340   SODIUM mmol/L 132*   POTASSIUM mmol/L 4 1   CHLORIDE mmol/L 102   CO2 mmol/L 24   BUN mg/dL 22   CREATININE mg/dL 0 94   ANION GAP mmol/L 6   CALCIUM mg/dL 8 9   ALBUMIN g/dL 3 9   TOTAL BILIRUBIN mg/dL 1 22*   ALK PHOS U/L 66   ALT U/L 37   AST U/L 35   GLUCOSE RANDOM mg/dL 105     Results from last 7 days   Lab Units 01/01/23  0217   INR  0 89         Results from last 7 days   Lab Units 01/01/23  0641 01/01/23  0217   HEMOGLOBIN A1C % 5 4 5 5           Imaging: I have personally reviewed pertinent reports  XR chest 1 view portable   ED Interpretation by Ioana Henriquez MD (01/04 2516)   Otherwise nonacute          EKG, Pathology, and Other Studies Reviewed on Admission:   · EKG: Sinus rhythm, no acute ST-T wave changes    Allscripts / Epic Records Reviewed: Yes     ** Please Note: This note has been constructed using a voice recognition system   **

## 2023-01-05 VITALS
TEMPERATURE: 97.9 F | HEART RATE: 68 BPM | SYSTOLIC BLOOD PRESSURE: 111 MMHG | DIASTOLIC BLOOD PRESSURE: 73 MMHG | RESPIRATION RATE: 16 BRPM | OXYGEN SATURATION: 94 %

## 2023-01-05 PROBLEM — R77.8 TROPONIN LEVEL ELEVATED: Status: ACTIVE | Noted: 2023-01-05

## 2023-01-05 LAB
ANION GAP SERPL CALCULATED.3IONS-SCNC: 7 MMOL/L (ref 4–13)
BASOPHILS # BLD AUTO: 0.07 THOUSANDS/ÂΜL (ref 0–0.1)
BASOPHILS NFR BLD AUTO: 1 % (ref 0–1)
BUN SERPL-MCNC: 18 MG/DL (ref 5–25)
CALCIUM SERPL-MCNC: 8.4 MG/DL (ref 8.4–10.2)
CHLORIDE SERPL-SCNC: 107 MMOL/L (ref 96–108)
CO2 SERPL-SCNC: 23 MMOL/L (ref 21–32)
CREAT SERPL-MCNC: 0.95 MG/DL (ref 0.6–1.3)
EOSINOPHIL # BLD AUTO: 0.08 THOUSAND/ÂΜL (ref 0–0.61)
EOSINOPHIL NFR BLD AUTO: 1 % (ref 0–6)
ERYTHROCYTE [DISTWIDTH] IN BLOOD BY AUTOMATED COUNT: 12.8 % (ref 11.6–15.1)
GFR SERPL CREATININE-BSD FRML MDRD: 84 ML/MIN/1.73SQ M
GLUCOSE SERPL-MCNC: 100 MG/DL (ref 65–140)
HCT VFR BLD AUTO: 40.4 % (ref 36.5–49.3)
HGB BLD-MCNC: 13.6 G/DL (ref 12–17)
IMM GRANULOCYTES # BLD AUTO: 0.09 THOUSAND/UL (ref 0–0.2)
IMM GRANULOCYTES NFR BLD AUTO: 1 % (ref 0–2)
LYMPHOCYTES # BLD AUTO: 1.57 THOUSANDS/ÂΜL (ref 0.6–4.47)
LYMPHOCYTES NFR BLD AUTO: 21 % (ref 14–44)
MCH RBC QN AUTO: 30.6 PG (ref 26.8–34.3)
MCHC RBC AUTO-ENTMCNC: 33.7 G/DL (ref 31.4–37.4)
MCV RBC AUTO: 91 FL (ref 82–98)
MONOCYTES # BLD AUTO: 0.72 THOUSAND/ÂΜL (ref 0.17–1.22)
MONOCYTES NFR BLD AUTO: 10 % (ref 4–12)
NEUTROPHILS # BLD AUTO: 5.04 THOUSANDS/ÂΜL (ref 1.85–7.62)
NEUTS SEG NFR BLD AUTO: 66 % (ref 43–75)
NRBC BLD AUTO-RTO: 0 /100 WBCS
PLATELET # BLD AUTO: 178 THOUSANDS/UL (ref 149–390)
PMV BLD AUTO: 9.3 FL (ref 8.9–12.7)
POTASSIUM SERPL-SCNC: 4.4 MMOL/L (ref 3.5–5.3)
RBC # BLD AUTO: 4.44 MILLION/UL (ref 3.88–5.62)
SODIUM SERPL-SCNC: 137 MMOL/L (ref 135–147)
WBC # BLD AUTO: 7.57 THOUSAND/UL (ref 4.31–10.16)

## 2023-01-05 RX ORDER — LOSARTAN POTASSIUM 25 MG/1
25 TABLET ORAL DAILY
Qty: 30 TABLET | Refills: 0 | Status: SHIPPED | OUTPATIENT
Start: 2023-01-06

## 2023-01-05 RX ADMIN — ASPIRIN 81 MG: 81 TABLET, CHEWABLE ORAL at 08:34

## 2023-01-05 RX ADMIN — METOPROLOL TARTRATE 25 MG: 25 TABLET, FILM COATED ORAL at 08:34

## 2023-01-05 RX ADMIN — TICAGRELOR 90 MG: 90 TABLET ORAL at 08:34

## 2023-01-05 RX ADMIN — ENOXAPARIN SODIUM 40 MG: 40 INJECTION SUBCUTANEOUS at 08:35

## 2023-01-05 RX ADMIN — LOSARTAN POTASSIUM 25 MG: 25 TABLET, FILM COATED ORAL at 08:34

## 2023-01-05 RX ADMIN — PANTOPRAZOLE SODIUM 40 MG: 40 TABLET, DELAYED RELEASE ORAL at 06:24

## 2023-01-05 RX ADMIN — LEVOTHYROXINE SODIUM 50 MCG: 50 TABLET ORAL at 05:11

## 2023-01-05 NOTE — PLAN OF CARE
Problem: Potential for Falls  Goal: Patient will remain free of falls  Description: INTERVENTIONS:  - Educate patient/family on patient safety including physical limitations  - Instruct patient to call for assistance with activity   - Consult OT/PT to assist with strengthening/mobility   - Keep Call bell within reach  - Keep bed low and locked with side rails adjusted as appropriate  - Keep care items and personal belongings within reach  - Initiate and maintain comfort rounds  - Make Fall Risk Sign visible to staff  - Offer Toileting every 2 Hours, in advance of need  - Initiate/Maintain bed/chair alarm  - Obtain necessary fall risk management equipment  - Apply yellow socks and bracelet for high fall risk patients  - Consider moving patient to room near nurses station  1/5/2023 1145 by Cb Chisholm RN  Outcome: Adequate for Discharge  1/5/2023 1015 by Cb Chisholm, RN  Outcome: Progressing

## 2023-01-05 NOTE — ASSESSMENT & PLAN NOTE
· Noted to have severely elevated triglyceride level of 899    This will need to be followed up after discharge  · Continue on statin

## 2023-01-05 NOTE — DISCHARGE INSTR - AVS FIRST PAGE
Dear Davey Barnett,     It was our pleasure to care for you here at [facility], [employer]  It is our hope that we were always able to exceed the expected standards for your care during your stay  You were hospitalized due to medication induced hypotension, symptomatic --you were cared for on the third floor by Deepak Perez PA-C under the service of Wes To DO with the [facility] who covers for your primary care physician (PCP), No primary care provider on file  , while you were hospitalized  If you have any questions or concerns related to this hospitalization, you may contact us at [phone]  For follow up as well as any medication refills, we recommend that you follow up with your primary care physician  A registered nurse will reach out to you by phone within a few days after your discharge to answer any additional questions that you may have after going home  However, at this time we provide for you here, the most important instructions / recommendations at discharge:     Notable Medication Adjustments -   Stop Imdur  Decrease metoprolol to 25 mg twice a day  Decrease Cozaar to 25 mg daily  Testing Required after Discharge -   Monitor blood pressure at home  Important follow up information -   Follow-up with cardiology  Other Instructions -   Cardiac diet, cardiac rehab  Please review this entire after visit summary as additional general instructions including medication list, appointments, activity, diet, any pertinent wound care, and other additional recommendations from your care team that may be provided for you        Sincerely,     Deepak Perez PA-C

## 2023-01-05 NOTE — CONSULTS
Consultation - Cardiology Team One  Jonathon Bethea 57 y o  male MRN: [id number]  Unit/Bed#: S -01 Encounter: [id number]    Inpatient consult to Cardiology  Consult performed by: ROBBY Cutler  Consult ordered by: Andres Matthews MD      Physician Requesting Consult: Justyna Booth DO  Reason for Consult / Principal Problem: Hypotension     Assessment/ Plan    1  Hypotension in the setting of antihypertensives   BP on admission was 86/55  Patient reports BP was as low as SBP 70s at home prior to admission   Was discharged 2023 on metoprolol XL 50 mg PO BID, losartan 50 mg PO daily and imdur 30 mg PO daily  Now on Metoprolol 25 mg PO BID and losartan 25 mg PO daily   BP stable this mornin/77     2  Recent Lateral STEMI 2023  On DAPT: aspirin and Brilinta, atorvastatin and metoprolol   Echocardiogram 2023 showed EF 55%, hypokinetic mid anterior, mid anterolateral, apical anterior and apical lateral and no significant valvular disease     3  Mixed Hyperlipidemia with Hypertriglyceridemia   On atorvastatin 80 mg PO daily  Repeat Lipid panel as outpatient   Consider adding fenobibrate as outpatient     History of Present Illness   HPI: Jonathon Bethea is a 57y o  year old male who has a history of recent lateral STEMI 2023 s/p TOM to LAD, hyperlipidemia, hypertriglyceridemia, hypertension and GERD  He is scheduled to follow with  Keshawn Benites 2023 but reports today Harsh Serna is out of his network and has follow up appointment scheduled with [facility]ology on Monday, 2023  He presents to [facility] 2023 with complaint of dizziness and lightheaded in the setting of hypotension  Patient was recently admitted 2023 with Lateral STEMI s/p TOM to LAD  He was discharged home on DAPT: aspirin and Brilinta  He was also discharged on losartan 50 mg PO daily, metoprolol 50 mg PO BID and imdur 30 mg PO daily   He reports since discharge he has been feeling fatigued and yesterday he felt dizzy and lightheaded  He took his BP yesterday morning prior to taking his medications and his SBP was 90s  He took his medications as prescribed and retook his BP later in afternoon and sbp was 70  He notes dizziness and lightheaded with low blood pressure  He has also been feeling fatigued  No complaint of chest pain or palpitations  BP on admission was 87/52  Antihypertensives were adjusted  Losartan was decreased to 25 mg PO daily, metoprolol decreased to 25 mg PO BID and imdur discontinued  He lives at home with his wife  No tobacco abuse  He drinks 4-5 glasses of wine per week  No family history of heart disease  Cardiac cath 1/1/2023 showed mid LAD 85% stenosed s/p TOM  Echocardiogram 1/2/2023 EF 55%, hypokinetic mid anterior, mid anterolateral, apical anterior and apical lateral and no significant valvular disease  EKG reviewed personally:  Sinus bradycardia   Ventricular rate 59 bpm  CT interval 158 ms  QRSD 78 ms  QT interval 432 ms  QTc interval 427 ms     Telemetry reviewed personally:   Normal sinus rhythm     Review of Systems   Constitutional: Negative for decreased appetite and diaphoresis  HENT: Negative for congestion  Cardiovascular: Negative for chest pain, dyspnea on exertion, leg swelling, orthopnea and palpitations  Respiratory: Negative for shortness of breath  Musculoskeletal: Negative for falls  Gastrointestinal: Negative for bloating, nausea and vomiting  Neurological: Negative for dizziness and light-headedness  Psychiatric/Behavioral: Negative for altered mental status  Historical Information   Past Medical History:   Diagnosis Date   • [facility]myocardial infarction[facility])      Past Surgical History:   Procedure Laterality Date   • CARDIAC CATHETERIZATION N/A 1/1/2023    Procedure: Cardiac catheterization;  Surgeon: Eda Meyer MD;  [facility];   Service: Cardiology   • CARDIAC CATHETERIZATION N/A 1/1/2023    Procedure: Cardiac pci;  Surgeon: Alon Patel MD;  [facility]; Service: Cardiology     Social History     Substance and Sexual Activity   Alcohol Use Yes    Comment: occ     Social History     Substance and Sexual Activity   Drug Use Yes     Social History     Tobacco Use   Smoking Status Never   Smokeless Tobacco Never     Family History: History reviewed  No pertinent family history  Meds/Allergies   all current active meds have been reviewed and current meds:   Current Facility-Administered Medications   Medication Dose Route Frequency   • acetaminophen (TYLENOL) tablet 650 mg  650 mg Oral Q6H PRN   • aspirin chewable tablet 81 mg  81 mg Oral Daily   • atorvastatin (LIPITOR) tablet 80 mg  80 mg Oral QPM   • enoxaparin (LOVENOX) subcutaneous injection 40 mg  40 mg Subcutaneous Daily   • levothyroxine tablet 50 mcg  50 mcg Oral Early Morning   • losartan (COZAAR) tablet 25 mg  25 mg Oral Daily   • metoprolol tartrate (LOPRESSOR) tablet 25 mg  25 mg Oral Q12H ABE   • nitroglycerin (NITROSTAT) SL tablet 0 4 mg  0 4 mg Sublingual Q5 Min PRN   • pantoprazole (PROTONIX) EC tablet 40 mg  40 mg Oral Daily Before Breakfast   • ticagrelor (BRILINTA) tablet 90 mg  90 mg Oral BID          Allergies   Allergen Reactions   • Pollen Extract Allergic Rhinitis       Objective   Vitals: Blood pressure 123/77, pulse 68, temperature 97 9 °F (36 6 °C), resp  rate 16, SpO2 94 %  ,     There is no height or weight on file to calculate BMI ,     Systolic (90GUU), CBY:60 , Min:86 , JKL:995     Diastolic (59PLF), ZHB:52, Min:52, Max:77      Intake/Output Summary (Last 24 hours) at 1/5/2023 0947  Last data filed at 1/5/2023 3678  Gross per 24 hour   Intake 1180 ml   Output --   Net 1180 ml     Weight (last 2 days)     None        Invasive Devices     Peripheral Intravenous Line  Duration           Peripheral IV 01/04/23 Right Antecubital <1 day              Physical Exam  HENT:      Head: Normocephalic  Mouth/Throat:      Mouth: Mucous membranes are moist    Cardiovascular:      Rate and Rhythm: Normal rate and regular rhythm  Pulses: Normal pulses  Pulmonary:      Effort: Pulmonary effort is normal  No respiratory distress  Breath sounds: Normal breath sounds  Abdominal:      General: Bowel sounds are normal       Palpations: Abdomen is soft  Musculoskeletal:         General: No swelling  Normal range of motion  Cervical back: Neck supple  Skin:     General: Skin is warm and dry  Capillary Refill: Capillary refill takes less than 2 seconds  Comments: R wrist AGNIESZKA  No hematoma or bleeding    Neurological:      General: No focal deficit present  Mental Status: He is alert and oriented to person, place, and time             LABORATORY RESULTS:      CBC with diff:   Results from last 7 days   Lab Units 01/05/23 0517 01/04/23  1340 01/01/23  0217   WBC Thousand/uL 7 57 10 10 12 33*   HEMOGLOBIN g/dL 13 6 14 7 17 2*   HEMATOCRIT % 40 4 43 2 48 8   MCV fL 91 90 86   PLATELETS Thousands/uL 178 195 247   MCH pg 30 6 30 6 30 4   MCHC g/dL 33 7 34 0 35 2   RDW % 12 8 12 6 12 9   MPV fL 9 3 9 0 8 5*   NRBC AUTO /100 WBCs 0 0  --        CMP:  Results from last 7 days   Lab Units 01/05/23 0517 01/04/23  1340 01/02/23  0500 01/01/23  0641 01/01/23  0217   POTASSIUM mmol/L 4 4 4 1 4 2 4 0 3 4*   CHLORIDE mmol/L 107 102 104 99 99   CO2 mmol/L 23 24 25 24 23   BUN mg/dL 18 22 16 22 23   CREATININE mg/dL 0 95 0 94 0 93 0 90 1 00   CALCIUM mg/dL 8 4 8 9 9 4 9 3 10 2   AST U/L  --  35  --   --  32   ALT U/L  --  37  --   --  64*   ALK PHOS U/L  --  66  --   --  89   EGFR ml/min/1 73sq m 84 85 86 89 79       BMP:  Results from last 7 days   Lab Units 01/05/23 0517 01/04/23  1340 01/02/23  0500 01/01/23  0641 01/01/23  0217   POTASSIUM mmol/L 4 4 4 1 4 2 4 0 3 4*   CHLORIDE mmol/L 107 102 104 99 99   CO2 mmol/L 23 24 25 24 23   BUN mg/dL 18 22 16 22 23   CREATININE mg/dL 0 95 0  94 0 93 0 90 1 00   CALCIUM mg/dL 8 4 8 9 9 4 9 3 10 2        Results from last 7 days   Lab Units 01/01/23  0217   MAGNESIUM mg/dL 2 2          Results from last 7 days   Lab Units 01/01/23  0641 01/01/23  0217   HEMOGLOBIN A1C % 5 4 5 5          Results from last 7 days   Lab Units 01/01/23  0641   TSH 3RD GENERATON uIU/mL 3 714       Results from last 7 days   Lab Units 01/01/23  0217   INR  0 89     Lipid Profile:   No results found for: CHOL  Lab Results   Component Value Date    HDL 39 (L) 01/01/2023     Lab Results   Component Value Date    [facility]  01/01/2023      Comment:      Calculated LDL invalid, triglycerides >400 mg/dl  This screening LDL is a calculated result  It does not have the accuracy of the Direct Measured LDL in the monitoring of patients with hyperlipidemia and/or statin therapy  Direct Measure LDL (HLL576) must be ordered separately in these patients  Lab Results   Component Value Date    TRIG 140 (H) 01/01/2023     Imaging:   XR chest 1 view portable    Result Date: 1/5/2023  Narrative: CHEST INDICATION:   hypotension  COMPARISON:  1/1/2023 EXAM PERFORMED/VIEWS:  XR CHEST PORTABLE FINDINGS: Cardiomediastinal silhouette appears unremarkable  The lungs are clear  No pneumothorax or pleural effusion  Osseous structures appear within normal limits for patient age  Impression: No acute cardiopulmonary disease  Workstation performed: [id number]     Cardiac catheterization    Result Date: 1/1/2023  Narrative: •  Left Anterior Descending The vessel was visualized by angiography and is moderate in size  Mid LAD lesion is 85% stenosed  Culprit lesion  Culprit for clinical presentation  DANYELL flow is 2  The lesion is non high-C, irregular, thrombotic and diffuse •  Drug-eluting stent was successfully placed  The stent used was a STENT XIENCE SKYPOINT 2 75 X 33MM  •  The intervention was successful  The guidewire crossed the lesion  Post-intervention DANYELL flow is 3   Lesion had 33 mm of its length treated  There were no complications  There is a 0% residual stenosis post intervention  Echo complete w/ contrast if indicated    Result Date: 1/2/2023  Narrative: •  Left Ventricle: Left ventricular cavity size is normal  Wall thickness is mildly increased  The left ventricular ejection fraction is 55%  Systolic function is normal  Diastolic function is normal  •  The following segments are hypokinetic: mid anterior, mid anterolateral, apical anterior and apical lateral  •  All other segments are normal  •  Mitral Valve: There is trace regurgitation  •  Tricuspid Valve: There is trace regurgitation  •  Pulmonary Artery: The pulmonary artery systolic pressure is normal        Thank you for allowing us to participate in this patient's care  Counseling / Coordination of Care  Total floor / unit time spent today 45 minutes  Greater than 50% of total time was spent with the patient and / or family counseling and / or coordination of care  A description of the counseling / coordination of care: Review of history, current assessment, development of a plan  Code Status: Level 1 - Full Code    ** Please Note: Dragon 360 Dictation voice to text software may have been used in the creation of this document   **

## 2023-01-05 NOTE — UTILIZATION REVIEW
Initial Clinical Review    Admission: Date/Time/Statement:   Admission Orders (From admission, onward)     Ordered        01/04/23 1652  Place in Observation  Once                      Orders Placed This Encounter   Procedures   • Place in Observation     Standing Status:   Standing     Number of Occurrences:   1     Order Specific Question:   Level of Care     Answer:   Med Surg [16]     ED Arrival Information     Expected   -    Arrival   1/4/2023 13:11    Acuity   Emergent            Means of arrival   Walk-In    Escorted by   Family Member    Service   Hospitalist    Admission type   Emergency            Arrival complaint   Low BP/Lighheaded  (had a heart attack couple days ago)           Chief Complaint   Patient presents with   • Hypotension     Patient arrives to the ER from home with complaints of hypotension BP was 80 systolic approx  1/2 hour ago  Pt  BP now 98/53  Pt has recently been placed on new BP medication after MI which occurred on 1/1/2023  -[facility] -WES       Initial Presentation: 57 y o  male who presents with recent MI status post drug-eluting stent, hyperlipidemia presented to the emergency department with complaints of low blood pressure and lightheadedness  Patient was started on Imdur 30 mg daily, metoprolol 50 mg twice daily, losartan 50 mg daily, today patient after taking these medications noted that his blood pressure was low in the 70s and he was feeling lightheadedness and weakness  Denies any chest pain, shortness of breath  Denies any nausea vomiting or abdominal pain  Denies any urinary or bowel complaints       ADMIT OBSERVATION STATUS      1/5 Cardiology Consult:      ED Triage Vitals   Temperature Pulse Respirations Blood Pressure SpO2   01/04/23 1327 01/04/23 1327 01/04/23 1327 01/04/23 1327 01/04/23 1327   98 2 °F (36 8 °C) 58 18 98/53 98 %      Temp Source Heart Rate Source Patient Position - Orthostatic VS BP Location FiO2 (%)   01/04/23 1917 -- 01/04/23 1917 01/04/23 1917 -- Oral  Lying Left arm       Pain Score       01/04/23 1945       No Pain          Wt Readings from Last 1 Encounters:   01/02/23 96 6 kg (213 lb)     Additional Vital Signs:   Date/Time Temp Pulse Resp BP MAP (mmHg) SpO2 O2 Device Patient Position - Orthostatic VS   01/05/23 08:17:50 97 9 °F (36 6 °C) 68 -- 123/77 92 94 % -- --   01/05/23 04:39:50 -- -- -- 111/71 84 -- -- --   01/04/23 19:49:43 -- -- 16 108/66 80 -- -- Sitting   01/04/23 19:17:13 98 °F (36 7 °C) 69 18 100/67 78 97 % None (Room air) Lying   01/04/23 1830 -- 62 18 91/57 70 98 % -- --   01/04/23 1715 -- 59 16 95/60 73 98 % -- --   01/04/23 1645 -- 62 18 89/60 Abnormal  71 98 % -- --   01/04/23 1600 -- 59 18 97/56 73 99 % -- --   01/04/23 1515 -- 59 18 92/64 73 100 % -- --   01/04/23 1500 -- 60 16 91/56 68 98 % -- --   01/04/23 1445 -- 59 18 90/54 68 98 % -- --   01/04/23 1430 -- 60 18 86/55 Abnormal  66 98 % -- --   01/04/23 1418 -- -- -- 87/52 Abnormal  -- -- -- --   01/04/23 1327 98 2 °F (36 8 °C) 58 18 98/53 -- 98 % None (Room air) --     Pertinent Labs/Diagnostic Test Results:   1/4 EKG: SR    XR chest 1 view portable   ED Interpretation by Miky Fraga MD (01/04 6119)   Otherwise nonacute      Final Result by Ebony Sparks MD (01/05 0278)      No acute cardiopulmonary disease                    Workstation performed: [id number]               Results from last 7 days   Lab Units 01/05/23  0517 01/04/23  1340 01/01/23  0217   WBC Thousand/uL 7 57 10 10 12 33*   HEMOGLOBIN g/dL 13 6 14 7 17 2*   HEMATOCRIT % 40 4 43 2 48 8   PLATELETS Thousands/uL 178 195 247   NEUTROS ABS Thousands/µL 5 04 7 12  --    BANDS PCT %  --   --  6         Results from last 7 days   Lab Units 01/05/23  0517 01/04/23  1340 01/02/23  0500 01/01/23  0641 01/01/23  0217   SODIUM mmol/L 137 132* 136 134* 136   POTASSIUM mmol/L 4 4 4 1 4 2 4 0 3 4*   CHLORIDE mmol/L 107 102 104 99 99   CO2 mmol/L 23 24 25 24 23   ANION GAP mmol/L 7 6 7 11 14*   BUN mg/dL 18 22 16 22 23 CREATININE mg/dL 0 95 0 94 0 93 0 90 1 00   EGFR ml/min/1 73sq m 84 85 86 89 79   CALCIUM mg/dL 8 4 8 9 9 4 9 3 10 2   MAGNESIUM mg/dL  --   --   --   --  2 2     Results from last 7 days   Lab Units 01/04/23  1340 01/01/23  0217   AST U/L 35 32   ALT U/L 37 64*   ALK PHOS U/L 66 89   TOTAL PROTEIN g/dL 6 9 7 2   ALBUMIN g/dL 3 9 4 2   TOTAL BILIRUBIN mg/dL 1 22* 0 47         Results from last 7 days   Lab Units 01/05/23  0517 01/04/23  1340 01/02/23  0500 01/01/23  0641 01/01/23  0217   GLUCOSE RANDOM mg/dL 100 105 126 119 88         Results from last 7 days   Lab Units 01/01/23  0641 01/01/23  0217   HEMOGLOBIN A1C % 5 4 5 5   EAG mg/dl 108 111     Results from last 7 days   Lab Units 01/04/23  1745 01/04/23  1539 01/04/23  1340 01/01/23  0641 01/01/23  0454 01/01/23  0217   HS TNI 0HR ng/L  --   --  1,513*  --   --  6   HS TNI 2HR ng/L  --  4,800*  --   --  230*  --    HSTNI D2 ng/L  --  11  --   --  224*  --    HS TNI 4HR ng/L 4,692*  --   --  842*  --   --    HSTNI D4 ng/L -97  --   --  836*  --   --          Results from last 7 days   Lab Units 01/01/23  0217   PROTIME seconds 12 2   INR  0 89   PTT seconds 24     Results from last 7 days   Lab Units 01/01/23  0641   TSH 3RD GENERATON uIU/mL 3 714     ED Treatment:   Medication Administration from 01/04/2023 1310 to 01/04/2023 1906       Date/Time Order Dose Route Action     01/04/2023 1428 EST sodium chloride 0 9 % bolus 1,000 mL 1,000 mL Intravenous New Bag        Past Medical History:   Diagnosis Date   • [facility]      Present on Admission:  • STEMI (ST elevation myocardial infarction) ([address] )  • GERD (gastroesophageal reflux disease)  • Mixed hyperlipidemia  • Hypotension due to drugs      Admitting Diagnosis: Hypotension [I95 9]  Elevated troponin [R77 8]  Medication adverse effect [T50 905A]  Generalized weakness [R53 1]  Age/Sex: 57 y o  male  Admission Orders:  Scheduled Medications:  aspirin, 81 mg, Oral, Daily  atorvastatin, 80 mg, Oral, QPM  enoxaparin, 40 mg, Subcutaneous, Daily  levothyroxine, 50 mcg, Oral, Early Morning  losartan, 25 mg, Oral, Daily  metoprolol tartrate, 25 mg, Oral, Q12H ABE  pantoprazole, 40 mg, Oral, Daily Before Breakfast  ticagrelor, 90 mg, Oral, BID      Continuous IV Infusions:     PRN Meds:  acetaminophen, 650 mg, Oral, Q6H PRN  nitroglycerin, 0 4 mg, Sublingual, Q5 Min PRN        IP CONSULT TO CARDIOLOGY    Network Utilization Review Department  ATTENTION: Please call with any questions or concerns to [phone] and carefully listen to the prompts so that you are directed to the right person  All voicemails are confidential   Farrel Bodily all requests for admission clinical reviews, approved or denied determinations and any other requests to dedicated fax number below belonging to the campus where the patient is receiving treatment   List of dedicated fax numbers for the Facilities:  [address] DENIALS (Administrative/Medical Necessity) [phone]   [address] (Maternity/NICU/Pediatrics) [phone]   [address] [phone]   [place] [address] [phone]   [address] [place] [address] [facility] [phone]   [facility] Ayleen Brewster [facility] [address] 815 [facility] [phone]

## 2023-01-05 NOTE — ASSESSMENT & PLAN NOTE
· Patient was just recently discharged from the hospital earlier this week following a STEMI multiple cardiac medications and presented to the hospital after having symptomatic hypotension  · Blood pressure noted to be low on admission but has stabilized after adjustment in medication including discontinuation of Imdur, decreased metoprolol from 50 mg twice daily down to 25 mg twice daily and decreasing Cozaar from 50 mg daily down to 25 mg daily

## 2023-01-05 NOTE — ASSESSMENT & PLAN NOTE
· On 1/1/2023, admitted lateral STEMI, long mid LAD stenosis, treated with drug-eluting stent  · continue aspirin, Brilinta, statin, BB  · Echocardiogram showed EF of 55%, no need to repeat

## 2023-01-05 NOTE — DISCHARGE SUMMARY
[facility]  Discharge- Red Kins 1960, 57 y o  male MRN: [id number]  Unit/Bed#: S -01 Encounter: [id number]  Primary Care Provider: No primary care provider on file  Date and time admitted to hospital: 1/4/2023  1:45 PM    * Hypotension due to drugs  Assessment & Plan  · Patient was just recently discharged from the hospital earlier this week following a STEMI multiple cardiac medications and presented to the hospital after having symptomatic hypotension  · Blood pressure noted to be low on admission but has stabilized after adjustment in medication including discontinuation of Imdur, decreased metoprolol from 50 mg twice daily down to 25 mg twice daily and decreasing Cozaar from 50 mg daily down to 25 mg daily    Troponin level elevated  Assessment & Plan  · Significant troponin elevation much higher than when patient presented for STEMI;  4789-->4800-->4692  · As discussed with cardiology, this is not felt to be acute MI unlikely was non-MI troponin elevation precipitated by hypotension    STEMI (ST elevation myocardial infarction) [facility])  Assessment & Plan  · On 1/1/2023, admitted lateral STEMI, long mid LAD stenosis, treated with drug-eluting stent  · continue aspirin, Brilinta, statin, BB  · Echocardiogram showed EF of 55%, no need to repeat    Mixed hyperlipidemia  Assessment & Plan  · Noted to have severely elevated triglyceride level of 899  This will need to be followed up after discharge  · Continue on statin    Medical Problems     Resolved Problems  Date Reviewed: 1/5/2023   None       Discharging Physician / Practitioner: Alana Kitchen PA-C  PCP: No primary care provider on file    Admission Date:   Admission Orders (From admission, onward)     Ordered        01/04/23 1652  Place in Observation  Once                      Discharge Date: 01/05/23    Consultations During Hospital Stay:  · cardiology    Procedures Performed:   · none    Significant Findings / Test Results:   · As above    Incidental Findings:   · none    Test Results Pending at Discharge (will require follow up):   · none     Outpatient Tests Requested:  · none    Complications:  none    Reason for Admission: Symptomatic hypotension    Hospital Course:   Beau Gonzalez is a 57 y o  male patient who originally presented to the hospital on 1/4/2023 due to symptomatic hypotension  Patient had just recently been discharged from this facility after a STEMI and had been put on multiple new cardiac medications  This was clearly the culprit for his hypotension  His medications were adjusted and his blood pressure remained stable  He was asymptomatic and appropriate for discharge  His troponin did go up quite substantially compared to his actual admission for STEMI but this was reviewed with cardiology and not felt to be of any acute concern at this time, likely was caused by his hypotension    Please see above list of diagnoses and related plan for additional information  Condition at Discharge: stable    Discharge Day Visit / Exam:   Subjective: Patient doing well now, no chest pain, shortness of breath, lightheadedness, dizziness  He feels back to normal now that his medications have been changed  Vitals: Blood Pressure: 111/73 (01/05/23 0900)  Pulse: 68 (01/05/23 0817)  Temperature: 97 9 °F (36 6 °C) (01/05/23 0817)  Temp Source: Oral (01/04/23 1917)  Respirations: 16 (01/04/23 1949)  SpO2: 94 % (01/05/23 0817)  Exam:   Physical Exam  Vitals reviewed  Constitutional:       General: He is not in acute distress  Appearance: Normal appearance  He is not ill-appearing, toxic-appearing or diaphoretic  Eyes:      General: No scleral icterus  Right eye: No discharge  Left eye: No discharge  Conjunctiva/sclera: Conjunctivae normal    Cardiovascular:      Rate and Rhythm: Normal rate and regular rhythm  Heart sounds: No murmur heard    Pulmonary:      Effort: No respiratory distress  Breath sounds: No stridor  No wheezing, rhonchi or rales  Abdominal:      General: There is no distension  Palpations: Abdomen is soft  Tenderness: There is no abdominal tenderness  There is no guarding  Musculoskeletal:      Right lower leg: No edema  Left lower leg: No edema  Skin:     General: Skin is warm and dry  Coloration: Skin is not jaundiced or pale  Findings: No bruising, erythema, lesion or rash  Neurological:      General: No focal deficit present  Mental Status: He is alert  Mental status is at baseline  Comments: Awake alert interactive, ambulatory without difficulty   Psychiatric:         Mood and Affect: Mood normal          Thought Content: Thought content normal         Tele unremarkable    Discussion with Family: Updated  (wife) via phone  Discharge instructions/Information to patient and family:   See after visit summary for information provided to patient and family  Provisions for Follow-Up Care:  See after visit summary for information related to follow-up care and any pertinent home health orders  Disposition:   Home    Planned Readmission: none     Discharge Statement:  I spent 35 minutes discharging the patient  This time was spent on the day of discharge  I had direct contact with the patient on the day of discharge  Greater than 50% of the total time was spent examining patient, answering all patient questions, arranging and discussing plan of care with patient as well as directly providing post-discharge instructions  Additional time then spent on discharge activities  Case was discussed with cardiology and nursing    Discharge Medications:  See after visit summary for reconciled discharge medications provided to patient and/or family        **Please Note: This note may have been constructed using a voice recognition system**

## 2023-01-05 NOTE — ASSESSMENT & PLAN NOTE
· Significant troponin elevation much higher than when patient presented for STEMI;  4789-->4800-->4692  · As discussed with cardiology, this is not felt to be acute MI unlikely was non-MI troponin elevation precipitated by hypotension

## 2023-01-06 LAB
ATRIAL RATE: 59 BPM
P AXIS: 60 DEGREES
PR INTERVAL: 158 MS
QRS AXIS: 54 DEGREES
QRSD INTERVAL: 78 MS
QT INTERVAL: 432 MS
QTC INTERVAL: 427 MS
T WAVE AXIS: 55 DEGREES
VENTRICULAR RATE: 59 BPM

## 2023-01-06 NOTE — ED PROVIDER NOTES
History  Chief Complaint   Patient presents with   • Hypotension     Patient arrives to the ER from home with complaints of hypotension BP was 80 systolic approx  1/2 hour ago  Pt  BP now 98/53  Pt has recently been placed on new BP medication after MI which occurred on 1/1/2023  -SOB -CP     77-year-old male with past medical history of CAD, recent STEMI on 1/1/23 s/p TOM placement, presents for evaluation of lightheadedness and generalized weakness noticed yesterday, worsening today at which point patient took his own blood pressure and found to be in the 70W systolic  Patient states he was placed on multiple new medications since his PCI 3 days ago and specifically started taking Rx Imdur yesterday  He denies any chest pain, shortness of breath, headaches, vision changes, focal weakness/numbness/tingling, abdominal pain, N/V/D, fever/chills or any other symptoms  Patient reports he has been eating/drinking at baseline however has recently started on a heart healthy diet  No recent sick contacts  No other concerns  Prior to Admission Medications   Prescriptions Last Dose Informant Patient Reported? Taking?   acetaminophen (TYLENOL) 325 mg tablet   No No   Sig: Take 2 tablets (650 mg total) by mouth every 6 (six) hours as needed for mild pain   aspirin 81 mg chewable tablet   No No   Sig: Chew 1 tablet (81 mg total) daily Do not start before January 3, 2023  atorvastatin (LIPITOR) 80 mg tablet   No No   Sig: Take 1 tablet (80 mg total) by mouth every evening   isosorbide mononitrate (IMDUR) 30 mg 24 hr tablet   No No   Sig: Take 1 tablet (30 mg total) by mouth daily Do not start before January 3, 2023  levothyroxine (Synthroid) 50 mcg tablet   No No   Sig: Take 1 tablet (50 mcg total) by mouth daily   losartan (COZAAR) 50 mg tablet   No No   Sig: Take 1 tablet (50 mg total) by mouth daily Do not start before January 3, 2023     metoprolol tartrate (LOPRESSOR) 50 mg tablet   No No   Sig: Take 1 tablet (50 mg total) by mouth every 12 (twelve) hours   nitroglycerin (NITROSTAT) 0 4 mg SL tablet   No No   Sig: Place 1 tablet (0 4 mg total) under the tongue every 5 (five) minutes as needed for chest pain   pantoprazole (PROTONIX) 40 mg tablet   No No   Sig: Take 1 tablet (40 mg total) by mouth daily   ticagrelor (BRILINTA) 90 MG   No No   Sig: Take 1 tablet (90 mg total) by mouth 2 (two) times a day      Facility-Administered Medications: None       Past Medical History:   Diagnosis Date   • MI (myocardial infarction) [facility])        Past Surgical History:   Procedure Laterality Date   • CARDIAC CATHETERIZATION N/A 1/1/2023    Procedure: Cardiac catheterization;  Surgeon: Damir Infante MD;  Location: [facility]; Service: Cardiology   • CARDIAC CATHETERIZATION N/A 1/1/2023    Procedure: Cardiac pci;  Surgeon: Damir Infante MD;  [facility]; Service: Cardiology       History reviewed  No pertinent family history  I have reviewed and agree with the history as documented  E-Cigarette/Vaping   • E-Cigarette Use Never User      E-Cigarette/Vaping Substances     Social History     Tobacco Use   • Smoking status: Never   • Smokeless tobacco: Never   Vaping Use   • Vaping Use: Never used   Substance Use Topics   • Alcohol use: Yes     Comment: occ   • Drug use: Yes        Review of Systems   Constitutional: Positive for fatigue  Negative for chills and fever  HENT: Negative for ear pain and sore throat  Eyes: Negative for pain and visual disturbance  Respiratory: Negative for cough and shortness of breath  Cardiovascular: Negative for chest pain and palpitations  Gastrointestinal: Negative for abdominal pain and vomiting  Genitourinary: Negative for dysuria and hematuria  Musculoskeletal: Negative for arthralgias and back pain  Skin: Negative for color change and rash  Neurological: Positive for light-headedness  Negative for seizures and syncope     All other systems reviewed and are negative  Physical Exam  ED Triage Vitals   Temperature Pulse Respirations Blood Pressure SpO2   01/04/23 1327 01/04/23 1327 01/04/23 1327 01/04/23 1327 01/04/23 1327   98 2 °F (36 8 °C) 58 18 98/53 98 %      Temp Source Heart Rate Source Patient Position - Orthostatic VS BP Location FiO2 (%)   01/04/23 1917 -- 01/04/23 1917 01/04/23 1917 --   Oral  Lying Left arm       Pain Score       01/04/23 1945       No Pain             Orthostatic Vital Signs  Vitals:    01/04/23 1949 01/05/23 0439 01/05/23 0817 01/05/23 0900   BP: 108/66 111/71 123/77 111/73   Pulse:   68    Patient Position - Orthostatic VS: Sitting          Physical Exam  Vitals and nursing note reviewed  Constitutional:       General: He is not in acute distress  Appearance: Normal appearance  He is well-developed  Comments: Mildly pallorous   HENT:      Head: Normocephalic and atraumatic  Right Ear: External ear normal       Left Ear: External ear normal       Nose: Nose normal       Mouth/Throat:      Mouth: Mucous membranes are moist    Eyes:      Extraocular Movements: Extraocular movements intact  Conjunctiva/sclera: Conjunctivae normal       Pupils: Pupils are equal, round, and reactive to light  Cardiovascular:      Rate and Rhythm: Normal rate and regular rhythm  Pulses: Normal pulses  Heart sounds: Normal heart sounds  No murmur heard  Pulmonary:      Effort: Pulmonary effort is normal  No respiratory distress  Breath sounds: Normal breath sounds  Abdominal:      General: Abdomen is flat  Palpations: Abdomen is soft  Tenderness: There is no abdominal tenderness  Musculoskeletal:         General: No swelling or tenderness  Normal range of motion  Cervical back: Normal range of motion and neck supple  Right lower leg: No edema  Left lower leg: No edema  Skin:     General: Skin is warm and dry        Capillary Refill: Capillary refill takes less than 2 seconds  Neurological:      General: No focal deficit present  Mental Status: He is alert and oriented to person, place, and time  Mental status is at baseline     Psychiatric:         Mood and Affect: Mood normal          Behavior: Behavior normal          ED Medications  Medications   sodium chloride 0 9 % bolus 1,000 mL (0 mL Intravenous Stopped 1/4/23 1528)       Diagnostic Studies  Results Reviewed     Procedure Component Value Units Date/Time    Basic metabolic panel [[id number]] Collected: 01/05/23 0517    Lab Status: Final result Specimen: Blood from Arm, Left Updated: 01/05/23 0610     Sodium 137 mmol/L      Potassium 4 4 mmol/L      Chloride 107 mmol/L      CO2 23 mmol/L      ANION GAP 7 mmol/L      BUN 18 mg/dL      Creatinine 0 95 mg/dL      Glucose 100 mg/dL      Calcium 8 4 mg/dL      eGFR 84 ml/min/1 73sq m     Narrative:      [place] guidelines for Chronic Kidney Disease (CKD):   •  Stage 1 with normal or high GFR (GFR > 90 mL/min/1 73 square meters)  •  Stage 2 Mild CKD (GFR = 60-89 mL/min/1 73 square meters)  •  Stage 3A Moderate CKD (GFR = 45-59 mL/min/1 73 square meters)  •  Stage 3B Moderate CKD (GFR = 30-44 mL/min/1 73 square meters)  •  Stage 4 Severe CKD (GFR = 15-29 mL/min/1 73 square meters)  •  Stage 5 End Stage CKD (GFR <15 mL/min/1 73 square meters)  Note: GFR calculation is accurate only with a steady state creatinine    CBC and differential [[id number]] Collected: 01/05/23 0517    Lab Status: Final result Specimen: Blood from Arm, Left Updated: 01/05/23 0552     WBC 7 57 Thousand/uL      RBC 4 44 Million/uL      Hemoglobin 13 6 g/dL      Hematocrit 40 4 %      MCV 91 fL      MCH 30 6 pg      MCHC 33 7 g/dL      RDW 12 8 %      MPV 9 3 fL      Platelets 552 Thousands/uL      nRBC 0 /100 WBCs      Neutrophils Relative 66 %      Immat GRANS % 1 %      Lymphocytes Relative 21 %      Monocytes Relative 10 %      Eosinophils Relative 1 %      Basophils Relative 1 %      Neutrophils Absolute 5 04 Thousands/µL      Immature Grans Absolute 0 09 Thousand/uL      Lymphocytes Absolute 1 57 Thousands/µL      Monocytes Absolute 0 72 Thousand/µL      Eosinophils Absolute 0 08 Thousand/µL      Basophils Absolute 0 07 Thousands/µL     HS Troponin I 4hr [[id number]]  (Abnormal) Collected: 01/04/23 1745    Lab Status: Final result Specimen: Blood from Arm, Right Updated: 01/04/23 1824     hs TnI 4hr 4,692 ng/L      Delta 4hr hsTnI -97 ng/L     HS Troponin I 2hr [[id number]]  (Abnormal) Collected: 01/04/23 1539    Lab Status: Final result Specimen: Blood from Arm, Right Updated: 01/04/23 1616     hs TnI 2hr 4,800 ng/L      Delta 2hr hsTnI 11 ng/L     HS Troponin 0hr (reflex protocol) [[id number]]  (Abnormal) Collected: 01/04/23 1340    Lab Status: Final result Specimen: Blood from Arm, Right Updated: 01/04/23 1432     hs TnI 0hr 4,789 ng/L     Comprehensive metabolic panel [[id number]]  (Abnormal) Collected: 01/04/23 1340    Lab Status: Final result Specimen: Blood from Arm, Right Updated: 01/04/23 1428     Sodium 132 mmol/L      Potassium 4 1 mmol/L      Chloride 102 mmol/L      CO2 24 mmol/L      ANION GAP 6 mmol/L      BUN 22 mg/dL      Creatinine 0 94 mg/dL      Glucose 105 mg/dL      Calcium 8 9 mg/dL      AST 35 U/L      ALT 37 U/L      Alkaline Phosphatase 66 U/L      Total Protein 6 9 g/dL      Albumin 3 9 g/dL      Total Bilirubin 1 22 mg/dL      eGFR 85 ml/min/1 73sq m     Narrative:      [place] guidelines for Chronic Kidney Disease (CKD):   •  Stage 1 with normal or high GFR (GFR > 90 mL/min/1 73 square meters)  •  Stage 2 Mild CKD (GFR = 60-89 mL/min/1 73 square meters)  •  Stage 3A Moderate CKD (GFR = 45-59 mL/min/1 73 square meters)  •  Stage 3B Moderate CKD (GFR = 30-44 mL/min/1 73 square meters)  •  Stage 4 Severe CKD (GFR = 15-29 mL/min/1 73 square meters)  •  Stage 5 End Stage CKD (GFR <15 mL/min/1 73 square meters)  Note: GFR calculation is accurate only with a steady state creatinine    CBC and differential [[id number]] Collected: 01/04/23 1340    Lab Status: Final result Specimen: Blood from Arm, Right Updated: 01/04/23 1353     WBC 10 10 Thousand/uL      RBC 4 81 Million/uL      Hemoglobin 14 7 g/dL      Hematocrit 43 2 %      MCV 90 fL      MCH 30 6 pg      MCHC 34 0 g/dL      RDW 12 6 %      MPV 9 0 fL      Platelets 629 Thousands/uL      nRBC 0 /100 WBCs      Neutrophils Relative 70 %      Immat GRANS % 2 %      Lymphocytes Relative 15 %      Monocytes Relative 11 %      Eosinophils Relative 1 %      Basophils Relative 1 %      Neutrophils Absolute 7 12 Thousands/µL      Immature Grans Absolute 0 18 Thousand/uL      Lymphocytes Absolute 1 54 Thousands/µL      Monocytes Absolute 1 12 Thousand/µL      Eosinophils Absolute 0 08 Thousand/µL      Basophils Absolute 0 06 Thousands/µL                  XR chest 1 view portable   ED Interpretation by Eliot Amaya MD (01/04 7173)   Otherwise nonacute      Final Result by Ruddy Abraham MD (01/05 8607)      No acute cardiopulmonary disease                    Workstation performed: [id number]               Procedures  ECG 12 Lead Documentation Only    Date/Time: 1/4/2023 1:33 PM  Performed by: Eliot Amaya MD  Authorized by: Eliot Amaya MD     Indications / Diagnosis:  Lightheadedness, generalized weakness  ECG reviewed by me, the ED Provider: yes    Patient location:  ED  Previous ECG:     Previous ECG:  Compared to current    Comparison ECG info:  1/1/23    Similarity:  Changes noted    Comparison to cardiac monitor: Yes    Interpretation:     Interpretation: non-specific    Rate:     ECG rate:  59    ECG rate assessment: bradycardic    Rhythm:     Rhythm: sinus rhythm    Ectopy:     Ectopy: none    QRS:     QRS axis:  Normal    QRS intervals:  Normal  Conduction:     Conduction: normal    ST segments:     ST segments:  Non-specific  Comments:      Nonspecific ST elevations in leads V4, V5 with depressions in, 3 however these are improved from previous EKG on 1/1/2022          ED Course                                       Medical Decision Making  Patient was given a total of 1 L IV fluids to increase blood pressures to 99I systolic and heart rates remained stable in the high 50s (initially 19E systolic)  Given recent PCI with TOM placement as well as recent multiple cardiac medications that were started status post PCI, cardiology was consulted who recommended decreasing the metoprolol and dc/ing Imdur  Given persistent hypotensive and bradycardic status, patient was admitted for further evaluation and management, as patient reported continuing to feel lightheaded  His troponin was noted to be 4700 compared to 800s during STEMI event on 1/1 however there was a decreasing trend of troponin at the 2 and 4-hour miracle overall, this is likely nonspecific, related to recent PCI  Patient agrees with plan for admission to observation  Pt understands and agreed with plan  All questions answered  No other concerns  Elevated troponin: complicated acute illness or injury  Generalized weakness: complicated acute illness or injury  Hypotension: complicated acute illness or injury  Medication adverse effect: complicated acute illness or injury  Amount and/or Complexity of Data Reviewed  Labs: ordered  Radiology: ordered and independent interpretation performed  Risk  Decision regarding hospitalization              Disposition  Final diagnoses:   Hypotension   Generalized weakness   Elevated troponin   Medication adverse effect     Time reflects when diagnosis was documented in both MDM as applicable and the Disposition within this note     Time User Action Codes Description Comment    1/4/2023  4:51 PM Fredo Garcia Record Add [I95 9] Hypotension     1/4/2023  4:51 PM Millie Garcia Add [R53 1] Generalized weakness     1/4/2023  4:51 PM Millie Garcia Add [R77 8] Elevated troponin     1/4/2023  4:51 PM Jose Millie Add [T50 905A] Medication adverse effect     1/5/2023 11:38 AM Leonardo Barlow Add [I21 3] ST elevation myocardial infarction (STEMI), unspecified artery [facility])       ED Disposition     ED Disposition   Admit    Condition   Stable    Date/Time   Wed Jan 4, 2023  4:51 PM    Comment   Case was discussed with Dr Kristyn Rosen and the patient's admission status was agreed to be Admission Status: observation status to the service of Dr Kristyn Rosen              Follow-up Information     Follow up With Specialties Details Why Herman Apple MD Cardiology Schedule an appointment as soon as possible for a visit in 1 week(s) with cardiology team [address] [place] [address]  [phone]            Discharge Medication List as of 1/5/2023 11:49 AM      CONTINUE these medications which have CHANGED    Details   losartan (COZAAR) 25 mg tablet Take 1 tablet (25 mg total) by mouth daily Do not start before January 6, 2023 , Starting Fri 1/6/2023, Normal      metoprolol tartrate (LOPRESSOR) 25 mg tablet Take 1 tablet (25 mg total) by mouth every 12 (twelve) hours, Starting Thu 1/5/2023, Normal         CONTINUE these medications which have NOT CHANGED    Details   acetaminophen (TYLENOL) 325 mg tablet Take 2 tablets (650 mg total) by mouth every 6 (six) hours as needed for mild pain, Starting Mon 1/2/2023, No Print      aspirin 81 mg chewable tablet Chew 1 tablet (81 mg total) daily Do not start before January 3, 2023 , Starting Tue 1/3/2023, Normal      atorvastatin (LIPITOR) 80 mg tablet Take 1 tablet (80 mg total) by mouth every evening, Starting Mon 1/2/2023, Normal      levothyroxine (Synthroid) 50 mcg tablet Take 1 tablet (50 mcg total) by mouth daily, Starting Mon 1/2/2023, No Print      nitroglycerin (NITROSTAT) 0 4 mg SL tablet Place 1 tablet (0 4 mg total) under the tongue every 5 (five) minutes as needed for chest pain, Starting Mon 1/2/2023, Normal      pantoprazole (PROTONIX) 40 mg tablet Take 1 tablet (40 mg total) by mouth daily, Starting Wed 1/6/2021, Normal      ticagrelor (BRILINTA) 90 MG Take 1 tablet (90 mg total) by mouth 2 (two) times a day, Starting Mon 1/2/2023, Normal         STOP taking these medications       isosorbide mononitrate (IMDUR) 30 mg 24 hr tablet Comments:   Reason for Stopping:             Outpatient Discharge Orders   Ambulatory  Referral to Cardiac Rehabilitation   Standing Status: Future Standing Exp  Date: 01/05/24      Discharge Diet     Activity as tolerated       PDMP Review     None           ED Provider  Attending physically available and evaluated [facility]  I managed the patient along with the ED Attending      Electronically Signed by         Mik Hickey MD  01/06/23 Cheng Garcia MD  01/06/23 0601

## (undated) DEVICE — GUIDEWIRE WHOLEY HI TORQUE INTERM MOD J .035 145CM

## (undated) DEVICE — RADIFOCUS OPTITORQUE ANGIOGRAPHIC CATHETER: Brand: OPTITORQUE

## (undated) DEVICE — TR BAND RADIAL ARTERY COMPRESSION DEVICE: Brand: TR BAND

## (undated) DEVICE — CATH GUIDE LAUNCHER 6FR EBU 3.5

## (undated) DEVICE — TREK CORONARY DILATATION CATHETER 2.75 MM X 20 MM / RAPID-EXCHANGE: Brand: TREK

## (undated) DEVICE — Device: Brand: PROWATER

## (undated) DEVICE — STENT ONYXNG27530UX ONYX 2.75X30RX
Type: IMPLANTABLE DEVICE | Site: CORONARY | Status: NON-FUNCTIONAL
Brand: ONYX FRONTIER™

## (undated) DEVICE — NC TREK NEO™ CORONARY DILATATION CATHETER 3.25 MM X 20 MM / RAPID-EXCHANGE: Brand: NC TREK NEO™

## (undated) DEVICE — DGW .035 FC J3MM 260CM TEF: Brand: EMERALD

## (undated) DEVICE — GLIDESHEATH SLENDER STAINLESS STEEL KIT: Brand: GLIDESHEATH SLENDER

## (undated) DEVICE — RUNTHROUGH NS EXTRA FLOPPY PTCA GUIDEWIRE: Brand: RUNTHROUGH